# Patient Record
Sex: FEMALE | Race: WHITE | NOT HISPANIC OR LATINO
[De-identification: names, ages, dates, MRNs, and addresses within clinical notes are randomized per-mention and may not be internally consistent; named-entity substitution may affect disease eponyms.]

---

## 2017-02-21 ENCOUNTER — RECORD ABSTRACTING (OUTPATIENT)
Age: 62
End: 2017-02-21

## 2017-02-21 ENCOUNTER — APPOINTMENT (OUTPATIENT)
Dept: ORTHOPEDIC SURGERY | Facility: CLINIC | Age: 62
End: 2017-02-21

## 2017-02-21 DIAGNOSIS — M19.90 UNSPECIFIED OSTEOARTHRITIS, UNSPECIFIED SITE: ICD-10-CM

## 2017-02-21 DIAGNOSIS — M10.062 IDIOPATHIC GOUT, LEFT KNEE: ICD-10-CM

## 2017-02-21 DIAGNOSIS — M54.5 LOW BACK PAIN: ICD-10-CM

## 2017-03-01 ENCOUNTER — APPOINTMENT (OUTPATIENT)
Dept: ORTHOPEDIC SURGERY | Facility: CLINIC | Age: 62
End: 2017-03-01

## 2017-03-30 ENCOUNTER — APPOINTMENT (OUTPATIENT)
Dept: ORTHOPEDIC SURGERY | Facility: CLINIC | Age: 62
End: 2017-03-30

## 2017-04-10 ENCOUNTER — APPOINTMENT (OUTPATIENT)
Dept: ORTHOPEDIC SURGERY | Facility: CLINIC | Age: 62
End: 2017-04-10

## 2017-04-10 DIAGNOSIS — M54.16 RADICULOPATHY, LUMBAR REGION: ICD-10-CM

## 2017-06-09 ENCOUNTER — APPOINTMENT (OUTPATIENT)
Dept: ORTHOPEDIC SURGERY | Facility: CLINIC | Age: 62
End: 2017-06-09

## 2017-06-09 VITALS
HEIGHT: 68 IN | DIASTOLIC BLOOD PRESSURE: 75 MMHG | SYSTOLIC BLOOD PRESSURE: 120 MMHG | HEART RATE: 77 BPM | WEIGHT: 293 LBS | BODY MASS INDEX: 44.41 KG/M2

## 2017-06-09 DIAGNOSIS — M16.11 UNILATERAL PRIMARY OSTEOARTHRITIS, RIGHT HIP: ICD-10-CM

## 2017-07-14 ENCOUNTER — APPOINTMENT (OUTPATIENT)
Dept: ORTHOPEDIC SURGERY | Facility: CLINIC | Age: 62
End: 2017-07-14

## 2017-07-14 VITALS — HEIGHT: 68 IN | BODY MASS INDEX: 44.41 KG/M2 | WEIGHT: 293 LBS

## 2017-07-14 DIAGNOSIS — Z78.9 OTHER SPECIFIED HEALTH STATUS: ICD-10-CM

## 2017-07-14 DIAGNOSIS — G56.03 CARPAL TUNNEL SYNDROM,BILATERAL UPPER LIMBS: ICD-10-CM

## 2017-07-21 ENCOUNTER — APPOINTMENT (OUTPATIENT)
Dept: ORTHOPEDIC SURGERY | Facility: CLINIC | Age: 62
End: 2017-07-21

## 2017-10-04 ENCOUNTER — APPOINTMENT (OUTPATIENT)
Dept: ULTRASOUND IMAGING | Facility: CLINIC | Age: 62
End: 2017-10-04
Payer: COMMERCIAL

## 2017-10-04 ENCOUNTER — OUTPATIENT (OUTPATIENT)
Dept: OUTPATIENT SERVICES | Facility: HOSPITAL | Age: 62
LOS: 1 days | End: 2017-10-04
Payer: COMMERCIAL

## 2017-10-04 DIAGNOSIS — R60.9 EDEMA, UNSPECIFIED: ICD-10-CM

## 2017-10-04 PROCEDURE — 93970 EXTREMITY STUDY: CPT

## 2017-10-04 PROCEDURE — 93970 EXTREMITY STUDY: CPT | Mod: 26

## 2017-11-07 ENCOUNTER — RESULT REVIEW (OUTPATIENT)
Age: 62
End: 2017-11-07

## 2017-12-01 ENCOUNTER — APPOINTMENT (OUTPATIENT)
Dept: ORTHOPEDIC SURGERY | Facility: CLINIC | Age: 62
End: 2017-12-01
Payer: COMMERCIAL

## 2017-12-01 PROCEDURE — 20610 DRAIN/INJ JOINT/BURSA W/O US: CPT | Mod: LT

## 2017-12-08 ENCOUNTER — APPOINTMENT (OUTPATIENT)
Dept: ORTHOPEDIC SURGERY | Facility: CLINIC | Age: 62
End: 2017-12-08
Payer: COMMERCIAL

## 2017-12-08 PROCEDURE — 20610 DRAIN/INJ JOINT/BURSA W/O US: CPT | Mod: LT

## 2017-12-15 ENCOUNTER — APPOINTMENT (OUTPATIENT)
Dept: ORTHOPEDIC SURGERY | Facility: CLINIC | Age: 62
End: 2017-12-15
Payer: COMMERCIAL

## 2017-12-15 PROCEDURE — 20610 DRAIN/INJ JOINT/BURSA W/O US: CPT | Mod: LT

## 2018-07-24 PROBLEM — M16.11 PRIMARY LOCALIZED OSTEOARTHROSIS OF PELVIC REGION, RIGHT: Status: ACTIVE | Noted: 2017-06-09

## 2018-09-15 ENCOUNTER — APPOINTMENT (OUTPATIENT)
Dept: ORTHOPEDIC SURGERY | Facility: CLINIC | Age: 63
End: 2018-09-15

## 2018-09-26 ENCOUNTER — APPOINTMENT (OUTPATIENT)
Dept: ORTHOPEDIC SURGERY | Facility: CLINIC | Age: 63
End: 2018-09-26
Payer: COMMERCIAL

## 2018-09-26 ENCOUNTER — APPOINTMENT (OUTPATIENT)
Dept: ORTHOPEDIC SURGERY | Facility: CLINIC | Age: 63
End: 2018-09-26

## 2018-09-26 DIAGNOSIS — M25.561 PAIN IN RIGHT KNEE: ICD-10-CM

## 2018-09-26 DIAGNOSIS — M25.562 PAIN IN RIGHT KNEE: ICD-10-CM

## 2018-09-26 PROCEDURE — 73564 X-RAY EXAM KNEE 4 OR MORE: CPT | Mod: RT

## 2018-09-26 PROCEDURE — 99213 OFFICE O/P EST LOW 20 MIN: CPT

## 2018-10-13 ENCOUNTER — APPOINTMENT (OUTPATIENT)
Dept: ORTHOPEDIC SURGERY | Facility: CLINIC | Age: 63
End: 2018-10-13
Payer: COMMERCIAL

## 2018-10-13 VITALS
WEIGHT: 293 LBS | DIASTOLIC BLOOD PRESSURE: 88 MMHG | HEART RATE: 63 BPM | BODY MASS INDEX: 45.99 KG/M2 | HEIGHT: 67 IN | SYSTOLIC BLOOD PRESSURE: 168 MMHG

## 2018-10-13 DIAGNOSIS — M25.551 PAIN IN RIGHT HIP: ICD-10-CM

## 2018-10-13 PROCEDURE — 99214 OFFICE O/P EST MOD 30 MIN: CPT

## 2018-10-13 PROCEDURE — 73502 X-RAY EXAM HIP UNI 2-3 VIEWS: CPT | Mod: RT

## 2018-10-17 ENCOUNTER — APPOINTMENT (OUTPATIENT)
Dept: ORTHOPEDIC SURGERY | Facility: CLINIC | Age: 63
End: 2018-10-17
Payer: COMMERCIAL

## 2018-10-17 PROCEDURE — 20610 DRAIN/INJ JOINT/BURSA W/O US: CPT | Mod: RT

## 2018-10-19 ENCOUNTER — APPOINTMENT (OUTPATIENT)
Dept: ORTHOPEDIC SURGERY | Facility: CLINIC | Age: 63
End: 2018-10-19

## 2018-10-26 ENCOUNTER — APPOINTMENT (OUTPATIENT)
Dept: ORTHOPEDIC SURGERY | Facility: CLINIC | Age: 63
End: 2018-10-26
Payer: COMMERCIAL

## 2018-10-26 PROCEDURE — 20610 DRAIN/INJ JOINT/BURSA W/O US: CPT | Mod: LT

## 2018-11-02 ENCOUNTER — TRANSCRIPTION ENCOUNTER (OUTPATIENT)
Age: 63
End: 2018-11-02

## 2018-11-02 ENCOUNTER — APPOINTMENT (OUTPATIENT)
Dept: ORTHOPEDIC SURGERY | Facility: CLINIC | Age: 63
End: 2018-11-02
Payer: COMMERCIAL

## 2018-11-02 PROCEDURE — 20610 DRAIN/INJ JOINT/BURSA W/O US: CPT | Mod: LT

## 2018-11-06 ENCOUNTER — OUTPATIENT (OUTPATIENT)
Dept: OUTPATIENT SERVICES | Facility: HOSPITAL | Age: 63
LOS: 1 days | End: 2018-11-06
Payer: COMMERCIAL

## 2018-11-06 VITALS
TEMPERATURE: 98 F | SYSTOLIC BLOOD PRESSURE: 149 MMHG | RESPIRATION RATE: 16 BRPM | HEART RATE: 58 BPM | WEIGHT: 293 LBS | HEIGHT: 66 IN | OXYGEN SATURATION: 98 % | DIASTOLIC BLOOD PRESSURE: 75 MMHG

## 2018-11-06 DIAGNOSIS — Z90.710 ACQUIRED ABSENCE OF BOTH CERVIX AND UTERUS: Chronic | ICD-10-CM

## 2018-11-06 DIAGNOSIS — Z98.890 OTHER SPECIFIED POSTPROCEDURAL STATES: Chronic | ICD-10-CM

## 2018-11-06 DIAGNOSIS — M16.11 UNILATERAL PRIMARY OSTEOARTHRITIS, RIGHT HIP: ICD-10-CM

## 2018-11-06 DIAGNOSIS — Z87.81 PERSONAL HISTORY OF (HEALED) TRAUMATIC FRACTURE: Chronic | ICD-10-CM

## 2018-11-06 DIAGNOSIS — E89.0 POSTPROCEDURAL HYPOTHYROIDISM: Chronic | ICD-10-CM

## 2018-11-06 DIAGNOSIS — Z01.818 ENCOUNTER FOR OTHER PREPROCEDURAL EXAMINATION: ICD-10-CM

## 2018-11-06 LAB
ALBUMIN SERPL ELPH-MCNC: 3.7 G/DL — SIGNIFICANT CHANGE UP (ref 3.3–5)
ALP SERPL-CCNC: 64 U/L — SIGNIFICANT CHANGE UP (ref 30–120)
ALT FLD-CCNC: 27 U/L DA — SIGNIFICANT CHANGE UP (ref 10–60)
ANION GAP SERPL CALC-SCNC: 7 MMOL/L — SIGNIFICANT CHANGE UP (ref 5–17)
APTT BLD: 35.5 SEC — SIGNIFICANT CHANGE UP (ref 28.5–37)
AST SERPL-CCNC: 18 U/L — SIGNIFICANT CHANGE UP (ref 10–40)
BILIRUB SERPL-MCNC: 0.2 MG/DL — SIGNIFICANT CHANGE UP (ref 0.2–1.2)
BLD GP AB SCN SERPL QL: SIGNIFICANT CHANGE UP
BUN SERPL-MCNC: 33 MG/DL — HIGH (ref 7–23)
CALCIUM SERPL-MCNC: 9.8 MG/DL — SIGNIFICANT CHANGE UP (ref 8.4–10.5)
CHLORIDE SERPL-SCNC: 102 MMOL/L — SIGNIFICANT CHANGE UP (ref 96–108)
CO2 SERPL-SCNC: 31 MMOL/L — SIGNIFICANT CHANGE UP (ref 22–31)
CREAT SERPL-MCNC: 0.91 MG/DL — SIGNIFICANT CHANGE UP (ref 0.5–1.3)
GLUCOSE SERPL-MCNC: 96 MG/DL — SIGNIFICANT CHANGE UP (ref 70–99)
HBA1C BLD-MCNC: 5.8 % — HIGH (ref 4–5.6)
HCT VFR BLD CALC: 40.5 % — SIGNIFICANT CHANGE UP (ref 34.5–45)
HGB BLD-MCNC: 12.3 G/DL — SIGNIFICANT CHANGE UP (ref 11.5–15.5)
INR BLD: 1.09 RATIO — SIGNIFICANT CHANGE UP (ref 0.88–1.16)
MCHC RBC-ENTMCNC: 26.2 PG — LOW (ref 27–34)
MCHC RBC-ENTMCNC: 30.4 GM/DL — LOW (ref 32–36)
MCV RBC AUTO: 86.2 FL — SIGNIFICANT CHANGE UP (ref 80–100)
MRSA PCR RESULT.: SIGNIFICANT CHANGE UP
NRBC # BLD: 0 /100 WBCS — SIGNIFICANT CHANGE UP (ref 0–0)
PLATELET # BLD AUTO: 304 K/UL — SIGNIFICANT CHANGE UP (ref 150–400)
POTASSIUM SERPL-MCNC: 4.5 MMOL/L — SIGNIFICANT CHANGE UP (ref 3.5–5.3)
POTASSIUM SERPL-SCNC: 4.5 MMOL/L — SIGNIFICANT CHANGE UP (ref 3.5–5.3)
PROT SERPL-MCNC: 8.3 G/DL — SIGNIFICANT CHANGE UP (ref 6–8.3)
PROTHROM AB SERPL-ACNC: 11.9 SEC — SIGNIFICANT CHANGE UP (ref 10–12.9)
RBC # BLD: 4.7 M/UL — SIGNIFICANT CHANGE UP (ref 3.8–5.2)
RBC # FLD: 15.6 % — HIGH (ref 10.3–14.5)
S AUREUS DNA NOSE QL NAA+PROBE: SIGNIFICANT CHANGE UP
SODIUM SERPL-SCNC: 140 MMOL/L — SIGNIFICANT CHANGE UP (ref 135–145)
WBC # BLD: 7.75 K/UL — SIGNIFICANT CHANGE UP (ref 3.8–10.5)
WBC # FLD AUTO: 7.75 K/UL — SIGNIFICANT CHANGE UP (ref 3.8–10.5)

## 2018-11-06 PROCEDURE — 86900 BLOOD TYPING SEROLOGIC ABO: CPT

## 2018-11-06 PROCEDURE — 85610 PROTHROMBIN TIME: CPT

## 2018-11-06 PROCEDURE — 86901 BLOOD TYPING SEROLOGIC RH(D): CPT

## 2018-11-06 PROCEDURE — 83036 HEMOGLOBIN GLYCOSYLATED A1C: CPT

## 2018-11-06 PROCEDURE — 93005 ELECTROCARDIOGRAM TRACING: CPT

## 2018-11-06 PROCEDURE — 85730 THROMBOPLASTIN TIME PARTIAL: CPT

## 2018-11-06 PROCEDURE — G0463: CPT

## 2018-11-06 PROCEDURE — 87641 MR-STAPH DNA AMP PROBE: CPT

## 2018-11-06 PROCEDURE — 85027 COMPLETE CBC AUTOMATED: CPT

## 2018-11-06 PROCEDURE — 87640 STAPH A DNA AMP PROBE: CPT

## 2018-11-06 PROCEDURE — 36415 COLL VENOUS BLD VENIPUNCTURE: CPT

## 2018-11-06 PROCEDURE — 93010 ELECTROCARDIOGRAM REPORT: CPT

## 2018-11-06 PROCEDURE — 80053 COMPREHEN METABOLIC PANEL: CPT

## 2018-11-06 PROCEDURE — 86850 RBC ANTIBODY SCREEN: CPT

## 2018-11-06 NOTE — H&P PST ADULT - PSH
History of arm fracture  1993-right  S/P carpal tunnel release  left  S/P hysterectomy  total  S/P thyroidectomy  partial

## 2018-11-06 NOTE — H&P PST ADULT - PMH
BMI 50.0-59.9, adult    Essential hypertension    History of gout    Hypothyroid    Primary osteoarthritis of right hip    Type 2 diabetes mellitus    Venous insufficiency of lower extremity

## 2018-11-07 ENCOUNTER — APPOINTMENT (OUTPATIENT)
Dept: ORTHOPEDIC SURGERY | Facility: CLINIC | Age: 63
End: 2018-11-07

## 2018-11-14 ENCOUNTER — APPOINTMENT (OUTPATIENT)
Dept: ORTHOPEDIC SURGERY | Facility: CLINIC | Age: 63
End: 2018-11-14
Payer: COMMERCIAL

## 2018-11-14 VITALS — BODY MASS INDEX: 45.99 KG/M2 | HEIGHT: 67 IN | WEIGHT: 293 LBS

## 2018-11-14 DIAGNOSIS — Z87.39 PERSONAL HISTORY OF OTHER DISEASES OF THE MUSCULOSKELETAL SYSTEM AND CONNECTIVE TISSUE: ICD-10-CM

## 2018-11-14 PROCEDURE — 99214 OFFICE O/P EST MOD 30 MIN: CPT

## 2018-11-14 RX ORDER — APREPITANT 80 MG/1
40 CAPSULE ORAL DAILY
Qty: 0 | Refills: 0 | Status: DISCONTINUED | OUTPATIENT
Start: 2018-11-26 | End: 2018-11-26

## 2018-11-15 PROBLEM — Z87.39 PERSONAL HISTORY OF OTHER DISEASES OF THE MUSCULOSKELETAL SYSTEM AND CONNECTIVE TISSUE: Chronic | Status: ACTIVE | Noted: 2018-11-06

## 2018-11-15 PROBLEM — E11.9 TYPE 2 DIABETES MELLITUS WITHOUT COMPLICATIONS: Chronic | Status: ACTIVE | Noted: 2018-11-06

## 2018-11-15 PROBLEM — M16.11 UNILATERAL PRIMARY OSTEOARTHRITIS, RIGHT HIP: Chronic | Status: ACTIVE | Noted: 2018-11-06

## 2018-11-15 PROBLEM — I10 ESSENTIAL (PRIMARY) HYPERTENSION: Chronic | Status: ACTIVE | Noted: 2018-11-06

## 2018-11-15 PROBLEM — E03.9 HYPOTHYROIDISM, UNSPECIFIED: Chronic | Status: ACTIVE | Noted: 2018-11-06

## 2018-11-15 PROBLEM — I87.2 VENOUS INSUFFICIENCY (CHRONIC) (PERIPHERAL): Chronic | Status: ACTIVE | Noted: 2018-11-06

## 2018-11-20 ENCOUNTER — APPOINTMENT (OUTPATIENT)
Dept: ORTHOPEDIC SURGERY | Facility: CLINIC | Age: 63
End: 2018-11-20
Payer: COMMERCIAL

## 2018-11-20 VITALS — HEART RATE: 66 BPM | SYSTOLIC BLOOD PRESSURE: 176 MMHG | DIASTOLIC BLOOD PRESSURE: 94 MMHG

## 2018-11-20 PROCEDURE — 99213 OFFICE O/P EST LOW 20 MIN: CPT

## 2018-11-23 RX ORDER — POLYETHYLENE GLYCOL 3350 17 G/17G
17 POWDER, FOR SOLUTION ORAL DAILY
Qty: 0 | Refills: 0 | Status: DISCONTINUED | OUTPATIENT
Start: 2018-11-26 | End: 2018-11-28

## 2018-11-23 RX ORDER — SENNA PLUS 8.6 MG/1
2 TABLET ORAL AT BEDTIME
Qty: 0 | Refills: 0 | Status: DISCONTINUED | OUTPATIENT
Start: 2018-11-26 | End: 2018-11-28

## 2018-11-23 RX ORDER — PANTOPRAZOLE SODIUM 20 MG/1
40 TABLET, DELAYED RELEASE ORAL
Qty: 0 | Refills: 0 | Status: DISCONTINUED | OUTPATIENT
Start: 2018-11-26 | End: 2018-11-28

## 2018-11-23 RX ORDER — DOCUSATE SODIUM 100 MG
100 CAPSULE ORAL THREE TIMES A DAY
Qty: 0 | Refills: 0 | Status: DISCONTINUED | OUTPATIENT
Start: 2018-11-26 | End: 2018-11-28

## 2018-11-23 RX ORDER — MAGNESIUM HYDROXIDE 400 MG/1
30 TABLET, CHEWABLE ORAL DAILY
Qty: 0 | Refills: 0 | Status: DISCONTINUED | OUTPATIENT
Start: 2018-11-26 | End: 2018-11-28

## 2018-11-23 RX ORDER — ONDANSETRON 8 MG/1
4 TABLET, FILM COATED ORAL EVERY 6 HOURS
Qty: 0 | Refills: 0 | Status: DISCONTINUED | OUTPATIENT
Start: 2018-11-26 | End: 2018-11-28

## 2018-11-26 ENCOUNTER — RESULT REVIEW (OUTPATIENT)
Age: 63
End: 2018-11-26

## 2018-11-26 ENCOUNTER — APPOINTMENT (OUTPATIENT)
Dept: ORTHOPEDIC SURGERY | Facility: HOSPITAL | Age: 63
End: 2018-11-26

## 2018-11-26 ENCOUNTER — INPATIENT (INPATIENT)
Facility: HOSPITAL | Age: 63
LOS: 1 days | Discharge: NURSING, REHAB OR LONG-TERM CARE | DRG: 470 | End: 2018-11-28
Attending: ORTHOPAEDIC SURGERY | Admitting: ORTHOPAEDIC SURGERY
Payer: COMMERCIAL

## 2018-11-26 VITALS
WEIGHT: 293 LBS | RESPIRATION RATE: 16 BRPM | SYSTOLIC BLOOD PRESSURE: 148 MMHG | HEIGHT: 67 IN | TEMPERATURE: 98 F | HEART RATE: 61 BPM | DIASTOLIC BLOOD PRESSURE: 65 MMHG | OXYGEN SATURATION: 98 %

## 2018-11-26 DIAGNOSIS — M16.11 UNILATERAL PRIMARY OSTEOARTHRITIS, RIGHT HIP: ICD-10-CM

## 2018-11-26 DIAGNOSIS — Z98.890 OTHER SPECIFIED POSTPROCEDURAL STATES: Chronic | ICD-10-CM

## 2018-11-26 DIAGNOSIS — Z87.81 PERSONAL HISTORY OF (HEALED) TRAUMATIC FRACTURE: Chronic | ICD-10-CM

## 2018-11-26 DIAGNOSIS — Z90.710 ACQUIRED ABSENCE OF BOTH CERVIX AND UTERUS: Chronic | ICD-10-CM

## 2018-11-26 DIAGNOSIS — E89.0 POSTPROCEDURAL HYPOTHYROIDISM: Chronic | ICD-10-CM

## 2018-11-26 LAB
ANION GAP SERPL CALC-SCNC: 8 MMOL/L — SIGNIFICANT CHANGE UP (ref 5–17)
BUN SERPL-MCNC: 28 MG/DL — HIGH (ref 7–23)
CALCIUM SERPL-MCNC: 9.2 MG/DL — SIGNIFICANT CHANGE UP (ref 8.4–10.5)
CHLORIDE SERPL-SCNC: 102 MMOL/L — SIGNIFICANT CHANGE UP (ref 96–108)
CO2 SERPL-SCNC: 30 MMOL/L — SIGNIFICANT CHANGE UP (ref 22–31)
CREAT SERPL-MCNC: 0.84 MG/DL — SIGNIFICANT CHANGE UP (ref 0.5–1.3)
GLUCOSE SERPL-MCNC: 148 MG/DL — HIGH (ref 70–99)
HCT VFR BLD CALC: 36.1 % — SIGNIFICANT CHANGE UP (ref 34.5–45)
HGB BLD-MCNC: 10.7 G/DL — LOW (ref 11.5–15.5)
MCHC RBC-ENTMCNC: 25.5 PG — LOW (ref 27–34)
MCHC RBC-ENTMCNC: 29.6 GM/DL — LOW (ref 32–36)
MCV RBC AUTO: 86 FL — SIGNIFICANT CHANGE UP (ref 80–100)
NRBC # BLD: 0 /100 WBCS — SIGNIFICANT CHANGE UP (ref 0–0)
PLATELET # BLD AUTO: 268 K/UL — SIGNIFICANT CHANGE UP (ref 150–400)
POTASSIUM SERPL-MCNC: 4.8 MMOL/L — SIGNIFICANT CHANGE UP (ref 3.5–5.3)
POTASSIUM SERPL-SCNC: 4.8 MMOL/L — SIGNIFICANT CHANGE UP (ref 3.5–5.3)
RBC # BLD: 4.2 M/UL — SIGNIFICANT CHANGE UP (ref 3.8–5.2)
RBC # FLD: 15.6 % — HIGH (ref 10.3–14.5)
SODIUM SERPL-SCNC: 140 MMOL/L — SIGNIFICANT CHANGE UP (ref 135–145)
WBC # BLD: 13.16 K/UL — HIGH (ref 3.8–10.5)
WBC # FLD AUTO: 13.16 K/UL — HIGH (ref 3.8–10.5)

## 2018-11-26 PROCEDURE — 27130 TOTAL HIP ARTHROPLASTY: CPT | Mod: AS,RT

## 2018-11-26 PROCEDURE — 88311 DECALCIFY TISSUE: CPT | Mod: 26

## 2018-11-26 PROCEDURE — 99223 1ST HOSP IP/OBS HIGH 75: CPT

## 2018-11-26 PROCEDURE — 88305 TISSUE EXAM BY PATHOLOGIST: CPT | Mod: 26

## 2018-11-26 PROCEDURE — 27130 TOTAL HIP ARTHROPLASTY: CPT | Mod: RT

## 2018-11-26 RX ORDER — TRANEXAMIC ACID 100 MG/ML
1000 INJECTION, SOLUTION INTRAVENOUS ONCE
Qty: 0 | Refills: 0 | Status: COMPLETED | OUTPATIENT
Start: 2018-11-26 | End: 2018-11-26

## 2018-11-26 RX ORDER — ACETAMINOPHEN 500 MG
1000 TABLET ORAL EVERY 6 HOURS
Qty: 0 | Refills: 0 | Status: COMPLETED | OUTPATIENT
Start: 2018-11-26 | End: 2018-11-27

## 2018-11-26 RX ORDER — OXYCODONE HYDROCHLORIDE 5 MG/1
10 TABLET ORAL
Qty: 0 | Refills: 0 | Status: DISCONTINUED | OUTPATIENT
Start: 2018-11-26 | End: 2018-11-28

## 2018-11-26 RX ORDER — CEFAZOLIN SODIUM 1 G
3000 VIAL (EA) INJECTION ONCE
Qty: 0 | Refills: 0 | Status: COMPLETED | OUTPATIENT
Start: 2018-11-26 | End: 2018-11-26

## 2018-11-26 RX ORDER — APIXABAN 2.5 MG/1
2.5 TABLET, FILM COATED ORAL EVERY 12 HOURS
Qty: 0 | Refills: 0 | Status: DISCONTINUED | OUTPATIENT
Start: 2018-11-27 | End: 2018-11-28

## 2018-11-26 RX ORDER — LEVOTHYROXINE SODIUM 125 MCG
200 TABLET ORAL DAILY
Qty: 0 | Refills: 0 | Status: DISCONTINUED | OUTPATIENT
Start: 2018-11-26 | End: 2018-11-28

## 2018-11-26 RX ORDER — SODIUM CHLORIDE 9 MG/ML
1000 INJECTION, SOLUTION INTRAVENOUS
Qty: 0 | Refills: 0 | Status: DISCONTINUED | OUTPATIENT
Start: 2018-11-26 | End: 2018-11-28

## 2018-11-26 RX ORDER — CHLORHEXIDINE GLUCONATE 213 G/1000ML
1 SOLUTION TOPICAL ONCE
Qty: 0 | Refills: 0 | Status: COMPLETED | OUTPATIENT
Start: 2018-11-26 | End: 2018-11-26

## 2018-11-26 RX ORDER — DEXTROSE 50 % IN WATER 50 %
25 SYRINGE (ML) INTRAVENOUS ONCE
Qty: 0 | Refills: 0 | Status: DISCONTINUED | OUTPATIENT
Start: 2018-11-26 | End: 2018-11-28

## 2018-11-26 RX ORDER — ALLOPURINOL 300 MG
300 TABLET ORAL DAILY
Qty: 0 | Refills: 0 | Status: DISCONTINUED | OUTPATIENT
Start: 2018-11-26 | End: 2018-11-28

## 2018-11-26 RX ORDER — DEXTROSE 50 % IN WATER 50 %
15 SYRINGE (ML) INTRAVENOUS ONCE
Qty: 0 | Refills: 0 | Status: DISCONTINUED | OUTPATIENT
Start: 2018-11-26 | End: 2018-11-28

## 2018-11-26 RX ORDER — ACETAMINOPHEN 500 MG
1000 TABLET ORAL ONCE
Qty: 0 | Refills: 0 | Status: COMPLETED | OUTPATIENT
Start: 2018-11-26 | End: 2018-11-26

## 2018-11-26 RX ORDER — OXYCODONE HYDROCHLORIDE 5 MG/1
5 TABLET ORAL
Qty: 0 | Refills: 0 | Status: DISCONTINUED | OUTPATIENT
Start: 2018-11-26 | End: 2018-11-28

## 2018-11-26 RX ORDER — ACETAMINOPHEN 500 MG
1000 TABLET ORAL EVERY 8 HOURS
Qty: 0 | Refills: 0 | Status: DISCONTINUED | OUTPATIENT
Start: 2018-11-27 | End: 2018-11-28

## 2018-11-26 RX ORDER — METFORMIN HYDROCHLORIDE 850 MG/1
500 TABLET ORAL DAILY
Qty: 0 | Refills: 0 | Status: DISCONTINUED | OUTPATIENT
Start: 2018-11-26 | End: 2018-11-28

## 2018-11-26 RX ORDER — GLUCAGON INJECTION, SOLUTION 0.5 MG/.1ML
1 INJECTION, SOLUTION SUBCUTANEOUS ONCE
Qty: 0 | Refills: 0 | Status: DISCONTINUED | OUTPATIENT
Start: 2018-11-26 | End: 2018-11-28

## 2018-11-26 RX ORDER — HYDROMORPHONE HYDROCHLORIDE 2 MG/ML
0.5 INJECTION INTRAMUSCULAR; INTRAVENOUS; SUBCUTANEOUS
Qty: 0 | Refills: 0 | Status: DISCONTINUED | OUTPATIENT
Start: 2018-11-26 | End: 2018-11-26

## 2018-11-26 RX ORDER — CELECOXIB 200 MG/1
200 CAPSULE ORAL EVERY 12 HOURS
Qty: 0 | Refills: 0 | Status: DISCONTINUED | OUTPATIENT
Start: 2018-11-26 | End: 2018-11-28

## 2018-11-26 RX ORDER — ONDANSETRON 8 MG/1
4 TABLET, FILM COATED ORAL ONCE
Qty: 0 | Refills: 0 | Status: DISCONTINUED | OUTPATIENT
Start: 2018-11-26 | End: 2018-11-26

## 2018-11-26 RX ORDER — LOSARTAN POTASSIUM 100 MG/1
50 TABLET, FILM COATED ORAL DAILY
Qty: 0 | Refills: 0 | Status: DISCONTINUED | OUTPATIENT
Start: 2018-11-28 | End: 2018-11-28

## 2018-11-26 RX ORDER — SODIUM CHLORIDE 9 MG/ML
1000 INJECTION, SOLUTION INTRAVENOUS
Qty: 0 | Refills: 0 | Status: DISCONTINUED | OUTPATIENT
Start: 2018-11-26 | End: 2018-11-27

## 2018-11-26 RX ORDER — HYDROMORPHONE HYDROCHLORIDE 2 MG/ML
0.5 INJECTION INTRAMUSCULAR; INTRAVENOUS; SUBCUTANEOUS
Qty: 0 | Refills: 0 | Status: DISCONTINUED | OUTPATIENT
Start: 2018-11-26 | End: 2018-11-28

## 2018-11-26 RX ORDER — NEBIVOLOL HYDROCHLORIDE 5 MG/1
2.5 TABLET ORAL DAILY
Qty: 0 | Refills: 0 | Status: DISCONTINUED | OUTPATIENT
Start: 2018-11-26 | End: 2018-11-28

## 2018-11-26 RX ORDER — INSULIN LISPRO 100/ML
VIAL (ML) SUBCUTANEOUS
Qty: 0 | Refills: 0 | Status: DISCONTINUED | OUTPATIENT
Start: 2018-11-26 | End: 2018-11-27

## 2018-11-26 RX ORDER — CEFAZOLIN SODIUM 1 G
3000 VIAL (EA) INJECTION EVERY 8 HOURS
Qty: 0 | Refills: 0 | Status: COMPLETED | OUTPATIENT
Start: 2018-11-26 | End: 2018-11-26

## 2018-11-26 RX ORDER — DEXTROSE 50 % IN WATER 50 %
12.5 SYRINGE (ML) INTRAVENOUS ONCE
Qty: 0 | Refills: 0 | Status: DISCONTINUED | OUTPATIENT
Start: 2018-11-26 | End: 2018-11-28

## 2018-11-26 RX ORDER — SODIUM CHLORIDE 9 MG/ML
1000 INJECTION, SOLUTION INTRAVENOUS
Qty: 0 | Refills: 0 | Status: DISCONTINUED | OUTPATIENT
Start: 2018-11-26 | End: 2018-11-26

## 2018-11-26 RX ADMIN — APREPITANT 40 MILLIGRAM(S): 80 CAPSULE ORAL at 07:27

## 2018-11-26 RX ADMIN — SODIUM CHLORIDE 125 MILLILITER(S): 9 INJECTION, SOLUTION INTRAVENOUS at 15:51

## 2018-11-26 RX ADMIN — Medication 400 MILLIGRAM(S): at 15:51

## 2018-11-26 RX ADMIN — Medication 200 MILLIGRAM(S): at 23:57

## 2018-11-26 RX ADMIN — OXYCODONE HYDROCHLORIDE 10 MILLIGRAM(S): 5 TABLET ORAL at 21:31

## 2018-11-26 RX ADMIN — Medication 1000 MILLIGRAM(S): at 16:12

## 2018-11-26 RX ADMIN — CHLORHEXIDINE GLUCONATE 1 APPLICATION(S): 213 SOLUTION TOPICAL at 07:25

## 2018-11-26 RX ADMIN — CELECOXIB 200 MILLIGRAM(S): 200 CAPSULE ORAL at 21:23

## 2018-11-26 RX ADMIN — HYDROMORPHONE HYDROCHLORIDE 0.5 MILLIGRAM(S): 2 INJECTION INTRAMUSCULAR; INTRAVENOUS; SUBCUTANEOUS at 19:18

## 2018-11-26 RX ADMIN — Medication 100 MILLIGRAM(S): at 21:23

## 2018-11-26 RX ADMIN — SODIUM CHLORIDE 75 MILLILITER(S): 9 INJECTION, SOLUTION INTRAVENOUS at 11:21

## 2018-11-26 RX ADMIN — CELECOXIB 200 MILLIGRAM(S): 200 CAPSULE ORAL at 21:30

## 2018-11-26 RX ADMIN — Medication 1000 MILLIGRAM(S): at 21:00

## 2018-11-26 RX ADMIN — HYDROMORPHONE HYDROCHLORIDE 0.5 MILLIGRAM(S): 2 INJECTION INTRAMUSCULAR; INTRAVENOUS; SUBCUTANEOUS at 18:59

## 2018-11-26 RX ADMIN — HYDROMORPHONE HYDROCHLORIDE 0.5 MILLIGRAM(S): 2 INJECTION INTRAMUSCULAR; INTRAVENOUS; SUBCUTANEOUS at 11:10

## 2018-11-26 RX ADMIN — Medication 200 MILLIGRAM(S): at 16:56

## 2018-11-26 RX ADMIN — Medication 400 MILLIGRAM(S): at 21:23

## 2018-11-26 RX ADMIN — SENNA PLUS 2 TABLET(S): 8.6 TABLET ORAL at 21:23

## 2018-11-26 RX ADMIN — OXYCODONE HYDROCHLORIDE 10 MILLIGRAM(S): 5 TABLET ORAL at 22:33

## 2018-11-26 NOTE — PHYSICAL THERAPY INITIAL EVALUATION ADULT - CRITERIA FOR SKILLED THERAPEUTIC INTERVENTIONS
anticipated equipment needs at discharge/impairments found/anticipated discharge recommendation/functional limitations in following categories/therapy frequency/predicted duration of therapy intervention

## 2018-11-26 NOTE — OCCUPATIONAL THERAPY INITIAL EVALUATION ADULT - ADDITIONAL COMMENTS
Pt lives with brother + tub Pt lives with brother + tub with doors pt can use back door 3 MAO + railing and a platform step. Pt can stay on 1st floor

## 2018-11-26 NOTE — PATIENT PROFILE ADULT - NSPROHMDIABETMGMTSTRAT_GEN_A_NUR
medication therapy/blood glucose testing/diet modification routine screenings/weight management/diet modification

## 2018-11-26 NOTE — PHYSICAL THERAPY INITIAL EVALUATION ADULT - GAIT DEVIATIONS NOTED, PT EVAL
decreased step length/decreased stride length/decreased weight-shifting ability/decreased tiffanie/decreased velocity of limb motion

## 2018-11-26 NOTE — PHYSICAL THERAPY INITIAL EVALUATION ADULT - ADDITIONAL COMMENTS
Pt lives with brother in house. Post discharge, pt will be using back door with 1+3 MAO with handrails. Pt will be staying on first floor of house and will have assistance from brother. Pt used 2 straight canes for ambulation at home. Pt has rolling walker and wheelchair as well.

## 2018-11-26 NOTE — BRIEF OPERATIVE NOTE - PROCEDURE
<<-----Click on this checkbox to enter Procedure Hip arthroplasty  11/26/2018  right anterior approach  Active  FREIDAK

## 2018-11-26 NOTE — OCCUPATIONAL THERAPY INITIAL EVALUATION ADULT - GENERAL OBSERVATIONS, REHAB EVAL
Patient with decreased ADL status and impairments with functional mobility. Pt found supine in bed +SCDs, IV, hemovac, LE wedges for positioning, NCO2

## 2018-11-26 NOTE — PHYSICAL THERAPY INITIAL EVALUATION ADULT - GENERAL OBSERVATIONS, REHAB EVAL
Pt supine in bed. +IV, +PAS, +Hemovac, +Tele, Pt supine in bed. +IV, +PAS, +Hemovac, +Tele, +Supplemental oxygen

## 2018-11-26 NOTE — CONSULT NOTE ADULT - ASSESSMENT
Patient is 64 yo female with hx of Obesity, Essential hypertension, gout, Hypothyroid, Primary osteoarthritis of right hip, Type 2 diabetes mellitus, and Venous insufficiency of lower extremity presenting with     1.  S/P Right Total hip replacement.  Continue with pain management, DVT proph, and wound care as per Ortho.  PT/OT  2. HTN.  Continue with Bystolic, ARBS, aldactone, and Hold Bumex  3.  Gout.  continue with allopurinol  4.  DM2.  Hold oral medications.  ISS, and Monitor POC  5. Hypothyroid.  Continue with levothyroxine  6.  Left shoulder pain.  Will obtain shoulder x-ray.  Ortho to follow up   Plan of care was discussed with patient, and brother in great details, All questions were answered to their satisfaction  Seems to understand, and in agreement

## 2018-11-26 NOTE — CONSULT NOTE ADULT - SUBJECTIVE AND OBJECTIVE BOX
CC.  S/P Right Total hip replacement  HPI.  Patient is 62 yo female with hx of HTN, and DM2 presenting with s/p right Total hip replacement.  Pain well controlled.    Patient is complaining of left shoulder pain that has been going on for the past several weeks, and contribute to using the cane    Offers no other complaints      Constitutional: No fever, fatigue or weight loss.  Skin: No rash.  Eyes: No recent vision problems or eye pain.  ENT: No congestion, ear pain, or sore throat.  Endocrine: No thyroid problems.  Cardiovascular: No chest pain or palpation.  Respiratory: No cough, shortness of breath, congestion, or wheezing.  Gastrointestinal: No abdominal pain, nausea, vomiting, or diarrhea.  Genitourinary: No dysuria.  Musculoskeletal: No joint swelling.  Neurologic: No headache.       Allergies/Medications:   Allergies:        Allergies:  	No Known Allergies:     Home Medications:   * Patient Currently Takes Medications as of 06-Nov-2018 11:37 documented in Structured Notes  · 	Bystolic 2.5 mg oral tablet: Last Dose Taken:  , 1 tab(s) orally once a day  · 	olmesartan 20 mg oral tablet: Last Dose Taken:  , 1 tab(s) orally once a day  · 	spironolactone 25 mg oral tablet: Last Dose Taken:  , 1 tab(s) orally 2 times a day  · 	bumetanide 1 mg oral tablet: Last Dose Taken:  , 1 tab(s) orally once a day  · 	levothyroxine 200 mcg (0.2 mg) oral tablet: Last Dose Taken:  , 1 tab(s) orally once a day  · 	allopurinol 300 mg oral tablet: Last Dose Taken:  , 1 tab(s) orally once a day  · 	metFORMIN 500 mg oral tablet, extended release: Last Dose Taken:  , 1 tab(s) orally once a day  · 	indomethacin 75 mg oral capsule, extended release: Last Dose Taken:  , 1 cap(s) orally once a day    PMH/PSH/FH/SH:    Past Medical History:  BMI 50.0-59.9, adult    Essential hypertension    History of gout    Hypothyroid    Primary osteoarthritis of right hip    Type 2 diabetes mellitus    Venous insufficiency of lower extremity.     Past Surgical History:  History of arm fracture  1993-right  S/P carpal tunnel release  left  S/P hysterectomy  total  S/P thyroidectomy  partial.     Family History:  Father  Still living? No  Family history of heart attack, Age at diagnosis: Age Unknown.     Social History:  · Marital Status	Single  · Lives With	spouse  denies any ETOH/tob/Illicit drug usage    Vital Signs Last 24 Hrs  T(C): 36.3 (26 Nov 2018 10:40), Max: 36.8 (26 Nov 2018 06:57)  T(F): 97.4 (26 Nov 2018 10:40), Max: 98.2 (26 Nov 2018 06:57)  HR: 50 (26 Nov 2018 13:29) (44 - 67)  BP: 117/69 (26 Nov 2018 13:29) (105/61 - 148/65)  BP(mean): --  RR: 9 (26 Nov 2018 13:29) (8 - 17)  SpO2: 98% (26 Nov 2018 13:29) (94% - 100%)      PHYSICAL EXAM-  GENERAL: NAD, well-groomed, well-developed  HEAD:  Atraumatic, Normocephalic  EYES: EOMI, PERRLA, conjunctiva and sclera clear  NECK: Supple, No JVD, Normal thyroid  NERVOUS SYSTEM:  Alert & Oriented X3, Motor Strength 5/5 B/L upper and lower extremities; DTRs 2+ intact and symmetric  CHEST/LUNG: Clear to percussion bilaterally; No rales, rhonchi, wheezing, or rubs  HEART: Regular rate and rhythm; No murmurs, rubs, or gallops  ABDOMEN: Soft, Nontender, Nondistended; Bowel sounds present  EXTREMITIES:  2+ Peripheral Pulses, No clubbing, cyanosis, or edema  SKIN: No rashes or lesions      Imaging Personally Reviewed:     [x ] YES  [ ] NO    Consultant(s) Notes Reviewed:  [x ] YES  [ ] NO    Care Discussed with Consultants/Other Providers [x ] YES  [ ] NO

## 2018-11-27 ENCOUNTER — TRANSCRIPTION ENCOUNTER (OUTPATIENT)
Age: 63
End: 2018-11-27

## 2018-11-27 LAB
ANION GAP SERPL CALC-SCNC: 7 MMOL/L — SIGNIFICANT CHANGE UP (ref 5–17)
BUN SERPL-MCNC: 27 MG/DL — HIGH (ref 7–23)
CALCIUM SERPL-MCNC: 9.2 MG/DL — SIGNIFICANT CHANGE UP (ref 8.4–10.5)
CHLORIDE SERPL-SCNC: 102 MMOL/L — SIGNIFICANT CHANGE UP (ref 96–108)
CO2 SERPL-SCNC: 30 MMOL/L — SIGNIFICANT CHANGE UP (ref 22–31)
CREAT SERPL-MCNC: 0.82 MG/DL — SIGNIFICANT CHANGE UP (ref 0.5–1.3)
GLUCOSE SERPL-MCNC: 113 MG/DL — HIGH (ref 70–99)
HCT VFR BLD CALC: 33.2 % — LOW (ref 34.5–45)
HGB BLD-MCNC: 9.9 G/DL — LOW (ref 11.5–15.5)
MCHC RBC-ENTMCNC: 26 PG — LOW (ref 27–34)
MCHC RBC-ENTMCNC: 29.8 GM/DL — LOW (ref 32–36)
MCV RBC AUTO: 87.1 FL — SIGNIFICANT CHANGE UP (ref 80–100)
NRBC # BLD: 0 /100 WBCS — SIGNIFICANT CHANGE UP (ref 0–0)
PLATELET # BLD AUTO: 249 K/UL — SIGNIFICANT CHANGE UP (ref 150–400)
POTASSIUM SERPL-MCNC: 4.5 MMOL/L — SIGNIFICANT CHANGE UP (ref 3.5–5.3)
POTASSIUM SERPL-SCNC: 4.5 MMOL/L — SIGNIFICANT CHANGE UP (ref 3.5–5.3)
RBC # BLD: 3.81 M/UL — SIGNIFICANT CHANGE UP (ref 3.8–5.2)
RBC # FLD: 15.5 % — HIGH (ref 10.3–14.5)
SODIUM SERPL-SCNC: 139 MMOL/L — SIGNIFICANT CHANGE UP (ref 135–145)
WBC # BLD: 11.53 K/UL — HIGH (ref 3.8–10.5)
WBC # FLD AUTO: 11.53 K/UL — HIGH (ref 3.8–10.5)

## 2018-11-27 PROCEDURE — 99232 SBSQ HOSP IP/OBS MODERATE 35: CPT

## 2018-11-27 PROCEDURE — 73030 X-RAY EXAM OF SHOULDER: CPT | Mod: 26,LT

## 2018-11-27 RX ORDER — INDOMETHACIN 50 MG
1 CAPSULE ORAL
Qty: 0 | Refills: 0 | COMMUNITY

## 2018-11-27 RX ORDER — DOCUSATE SODIUM 100 MG
1 CAPSULE ORAL
Qty: 0 | Refills: 0 | COMMUNITY
Start: 2018-11-27

## 2018-11-27 RX ORDER — OXYCODONE HYDROCHLORIDE 5 MG/1
1 TABLET ORAL
Qty: 42 | Refills: 0 | OUTPATIENT
Start: 2018-11-27 | End: 2018-12-03

## 2018-11-27 RX ORDER — APIXABAN 2.5 MG/1
1 TABLET, FILM COATED ORAL
Qty: 7 | Refills: 0 | OUTPATIENT
Start: 2018-11-27

## 2018-11-27 RX ORDER — CELECOXIB 200 MG/1
1 CAPSULE ORAL
Qty: 60 | Refills: 0 | OUTPATIENT
Start: 2018-11-27 | End: 2018-12-26

## 2018-11-27 RX ORDER — PANTOPRAZOLE SODIUM 20 MG/1
1 TABLET, DELAYED RELEASE ORAL
Qty: 30 | Refills: 1 | OUTPATIENT
Start: 2018-11-27 | End: 2019-01-25

## 2018-11-27 RX ORDER — ACETAMINOPHEN 500 MG
2 TABLET ORAL
Qty: 0 | Refills: 0 | COMMUNITY
Start: 2018-11-27

## 2018-11-27 RX ORDER — APIXABAN 2.5 MG/1
1 TABLET, FILM COATED ORAL
Qty: 60 | Refills: 0 | OUTPATIENT
Start: 2018-11-27 | End: 2018-12-26

## 2018-11-27 RX ORDER — SENNA PLUS 8.6 MG/1
2 TABLET ORAL
Qty: 0 | Refills: 0 | COMMUNITY
Start: 2018-11-27

## 2018-11-27 RX ORDER — POLYETHYLENE GLYCOL 3350 17 G/17G
17 POWDER, FOR SOLUTION ORAL
Qty: 0 | Refills: 0 | COMMUNITY
Start: 2018-11-27

## 2018-11-27 RX ADMIN — Medication 1000 MILLIGRAM(S): at 18:39

## 2018-11-27 RX ADMIN — OXYCODONE HYDROCHLORIDE 10 MILLIGRAM(S): 5 TABLET ORAL at 20:54

## 2018-11-27 RX ADMIN — SENNA PLUS 2 TABLET(S): 8.6 TABLET ORAL at 20:20

## 2018-11-27 RX ADMIN — OXYCODONE HYDROCHLORIDE 10 MILLIGRAM(S): 5 TABLET ORAL at 01:00

## 2018-11-27 RX ADMIN — Medication 100 MILLIGRAM(S): at 13:49

## 2018-11-27 RX ADMIN — Medication 1000 MILLIGRAM(S): at 17:06

## 2018-11-27 RX ADMIN — Medication 400 MILLIGRAM(S): at 02:57

## 2018-11-27 RX ADMIN — APIXABAN 2.5 MILLIGRAM(S): 2.5 TABLET, FILM COATED ORAL at 20:20

## 2018-11-27 RX ADMIN — APIXABAN 2.5 MILLIGRAM(S): 2.5 TABLET, FILM COATED ORAL at 08:17

## 2018-11-27 RX ADMIN — CELECOXIB 200 MILLIGRAM(S): 200 CAPSULE ORAL at 08:17

## 2018-11-27 RX ADMIN — Medication 1000 MILLIGRAM(S): at 02:57

## 2018-11-27 RX ADMIN — OXYCODONE HYDROCHLORIDE 10 MILLIGRAM(S): 5 TABLET ORAL at 04:00

## 2018-11-27 RX ADMIN — OXYCODONE HYDROCHLORIDE 10 MILLIGRAM(S): 5 TABLET ORAL at 20:20

## 2018-11-27 RX ADMIN — OXYCODONE HYDROCHLORIDE 10 MILLIGRAM(S): 5 TABLET ORAL at 06:52

## 2018-11-27 RX ADMIN — OXYCODONE HYDROCHLORIDE 10 MILLIGRAM(S): 5 TABLET ORAL at 00:10

## 2018-11-27 RX ADMIN — CELECOXIB 200 MILLIGRAM(S): 200 CAPSULE ORAL at 20:20

## 2018-11-27 RX ADMIN — OXYCODONE HYDROCHLORIDE 10 MILLIGRAM(S): 5 TABLET ORAL at 06:08

## 2018-11-27 RX ADMIN — PANTOPRAZOLE SODIUM 40 MILLIGRAM(S): 20 TABLET, DELAYED RELEASE ORAL at 06:08

## 2018-11-27 RX ADMIN — Medication 100 MILLIGRAM(S): at 06:08

## 2018-11-27 RX ADMIN — OXYCODONE HYDROCHLORIDE 10 MILLIGRAM(S): 5 TABLET ORAL at 09:12

## 2018-11-27 RX ADMIN — Medication 1000 MILLIGRAM(S): at 08:18

## 2018-11-27 RX ADMIN — METFORMIN HYDROCHLORIDE 500 MILLIGRAM(S): 850 TABLET ORAL at 12:21

## 2018-11-27 RX ADMIN — Medication 1000 MILLIGRAM(S): at 08:17

## 2018-11-27 RX ADMIN — CELECOXIB 200 MILLIGRAM(S): 200 CAPSULE ORAL at 08:18

## 2018-11-27 RX ADMIN — CELECOXIB 200 MILLIGRAM(S): 200 CAPSULE ORAL at 20:21

## 2018-11-27 RX ADMIN — Medication 300 MILLIGRAM(S): at 12:21

## 2018-11-27 RX ADMIN — Medication 200 MICROGRAM(S): at 06:08

## 2018-11-27 RX ADMIN — OXYCODONE HYDROCHLORIDE 10 MILLIGRAM(S): 5 TABLET ORAL at 03:01

## 2018-11-27 RX ADMIN — OXYCODONE HYDROCHLORIDE 10 MILLIGRAM(S): 5 TABLET ORAL at 11:00

## 2018-11-27 RX ADMIN — Medication 100 MILLIGRAM(S): at 20:20

## 2018-11-27 NOTE — DISCHARGE NOTE ADULT - HOSPITAL COURSE
SLICK AUGUSTIN    Admitted on 11-26-18     63y y.o.  Female with history of DJD (degenerative joint disease): right hip    Surgery:   Right anterior Hip arthroplasty    Orthopedic Surgeon:   Dr. TED Cormier    [place]-operative antibiotic:    ceFAZolin   IVPB:,       Consulted Services : Hospitalist, Physical Therapy, Occupational Therapy    Typical Physical & occupational therapy modalities post Hip arthroplasty   were performed including ambulation training, range of motion, ADL's, and transfers.     DVT prophylaxis:  apixaban: 2.5 milliGRAM(s)       The patient had a clean appearing surgical incision with no sign of surgical site infections and had a stable neuro / vascular exam of the operated extremity.  After progression of mobility guided by the PT/ OT staff,  the patient was felt to benefit from further rehabilitative care for restoration to level of function. This was felt to best be accomplished at Home.    Discharge and Orthopedic Care instructions were delineated in the Discharge Plan and reviewed with the patient. All medications were delineated in the medication reconciliation tool and key points were reviewed with the patient.     This patient was deemed stable from an Orthopedic & medical standpoint for discharge today.  She will follow up with Dr. TED Cormier for further Orthopedic care.     Patient Discharged with Following prescriptions:    apixaban 2.5 mg oral tablet: 1 tab(s) orally every 12 hours  apixaban 2.5 mg oral tablet: 1 tab(s) orally every 12 hours  celecoxib 200 mg oral capsule: 1 cap(s) orally every 12 hours  oxyCODONE 5 mg oral tablet: 1 tab(s) orally every 4 hours, As Needed -Pain  MDD:6  Reference #: [id number]   pantoprazole 40 mg oral delayed release tablet: 1 tab(s) orally once a day (before a meal)

## 2018-11-27 NOTE — DISCHARGE NOTE ADULT - ADDITIONAL INSTRUCTIONS
Follow up with Dr. Cormier on 12/10/18 at 10:00am. Follow up with Dr. Cormier on 12/10/18 at 10:00am.  Disposition  Victoriano ferrara A1c 5.8%

## 2018-11-27 NOTE — DISCHARGE NOTE ADULT - MEDICATION SUMMARY - MEDICATIONS TO TAKE
I will START or STAY ON the medications listed below when I get home from the hospital:    Bariatric Rolling Walker with 5 inch wheels  -- Dx: s/p Right Anterior THR  -- Indication: For ambulation assistance    Wide 3:1 Commode  -- Dx: s/p Right Anterior THR  -- Indication: For toileting    Raised Toilet Seat without Handles  -- Dx: s/p Right Anterior THR  -- Indication: For toileting    spironolactone 25 mg oral tablet  -- 1 tab(s) by mouth 2 times a day  -- Indication: For high blood pressure    acetaminophen 500 mg oral tablet  -- 2 tab(s) by mouth every 8 hours  -- Indication: For Pain    celecoxib 200 mg oral capsule  -- 1 cap(s) by mouth every 12 hours  -- Indication: For Pain    oxyCODONE 5 mg oral tablet  -- 1 tab(s) by mouth every 4 hours, As Needed -Pain  MDD:6  Reference #: [id number]   -- Indication: For Pain    olmesartan 20 mg oral tablet  -- 1 tab(s) by mouth once a day  -- Indication: For high blood pressure    apixaban 2.5 mg oral tablet  -- 1 tab(s) by mouth every 12 hours  -- Indication: For Prevention of blood clots    metFORMIN 500 mg oral tablet, extended release  -- 1 tab(s) by mouth once a day  -- Indication: For diabetes    allopurinol 300 mg oral tablet  -- 1 tab(s) by mouth once a day  -- Indication: For gout    Bystolic 2.5 mg oral tablet  -- 1 tab(s) by mouth once a day  -- Indication: For high blood preesure    bumetanide 1 mg oral tablet  -- 1 tab(s) by mouth once a day  -- Indication: For high blood pressure    senna oral tablet  -- 2 tab(s) by mouth once a day (at bedtime)  -- Indication: For constipation     docusate sodium 100 mg oral capsule  -- 1 cap(s) by mouth 3 times a day  -- Indication: For stool softener    polyethylene glycol 3350 oral powder for reconstitution  -- 17 gram(s) by mouth once a day, As needed, Constipation  -- Indication: For constipation    pantoprazole 40 mg oral delayed release tablet  -- 1 tab(s) by mouth once a day (before a meal)  -- Indication: For Prevention of ulcers    levothyroxine 200 mcg (0.2 mg) oral tablet  -- 1 tab(s) by mouth once a day  -- Indication: For thyroid disease

## 2018-11-27 NOTE — DISCHARGE NOTE ADULT - INSTRUCTIONS
Accuchecks before meals and at bedtime  Hypoglycemic/Hyperglycemic protocol as per PMD  Continue Consistent Carbohydrate diet

## 2018-11-27 NOTE — PROGRESS NOTE ADULT - ASSESSMENT
Patient is 62 yo female with hx of Obesity, Essential hypertension, gout, Hypothyroid, Primary osteoarthritis of right hip, Type 2 diabetes mellitus, and Venous insufficiency of lower extremity presenting with     1.  S/P Right Total hip replacement.  Continue with pain management, DVT proph, and wound care as per Ortho.  PT/OT  2. HTN.  Continue with Bystolic, ARBS, aldactone, and Hold Bumex  3.  Gout.  continue with allopurinol  4.  DM2.  Hold oral medications.  ISS, and Monitor POC.  HBA1C 5.8  5. Hypothyroid.  Continue with levothyroxine  6.  Left shoulder pain.  shoulder x-ray shows Calcific tendinopathy of the left rotator cuff..  Ortho to follow up   7.  Anemia.  Seems to be secondary to post-operative blood loss.  Asymptomatic.  Monitor H/H  Plan of care was discussed with patient, and brother in great details, All questions were answered to their satisfaction  Seems to understand, and in agreement Patient is 62 yo female with hx of Obesity, Essential hypertension, gout, Hypothyroid, Primary osteoarthritis of right hip, Type 2 diabetes mellitus, and Venous insufficiency of lower extremity presenting with     1.  S/P Right Total hip replacement.  Continue with pain management, DVT proph, and wound care as per Ortho.  PT/OT  2. HTN.  Continue with Bystolic, ARBS, aldactone, and Hold Bumex  3.  Gout.  continue with allopurinol  4.  DM2. Continue with metformin.  HBA1C 5.8  5. Hypothyroid.  Continue with levothyroxine  6.  Left shoulder pain.  shoulder x-ray shows Calcific tendinopathy of the left rotator cuff..  Ortho to follow up   7.  Anemia.  Seems to be secondary to post-operative blood loss.  Asymptomatic.  Monitor H/H  Plan of care was discussed with patient, and brother in great details, All questions were answered to their satisfaction  Seems to understand, and in agreement Patient is 62 yo female with hx of Obesity, Essential hypertension, gout, Hypothyroid, Primary osteoarthritis of right hip, Type 2 diabetes mellitus, and Venous insufficiency of lower extremity presenting with     1.  S/P Right Total hip replacement.  Continue with pain management, DVT proph, and wound care as per Ortho.  PT/OT  2. HTN.  Continue with Bystolic, ARBS, aldactone, and Hold Bumex  3.  Gout.  continue with allopurinol  4.  Pre-DM. Continue with metformin.  HBA1C 5.8  5. Hypothyroid.  Continue with levothyroxine  6.  Left shoulder pain.  shoulder x-ray shows Calcific tendinopathy of the left rotator cuff..  Ortho to follow up   7.  Anemia.  Seems to be secondary to post-operative blood loss.  Asymptomatic.  Monitor H/H  Plan of care was discussed with patient, and brother in great details, All questions were answered to their satisfaction  Seems to understand, and in agreement

## 2018-11-27 NOTE — DISCHARGE NOTE ADULT - PLAN OF CARE
Improve quality of life Your new total hip replacement requires proper care.  Your surgical care provider is your best resource for information.  Your Physical Therapy /Occupational Therapy will include Ambulation, Transfers , Stairs, ADLs (activities of daily living), range of motion, and isometrics.  Your participation is vital for the fullest recovery and best results.  You may bear full weight as tolerated with rolling walker, cane or assistive device.    TOTAL HIP PRECAUTIONS  No straight leg raise  No external rotation of hip when extended-standing or lying flat  No hyperextension of hip when standing (kickback).  Do not take a tub bath yet.   Do not resume driving until you have your surgeon’s permission.     Keep incision clean and dry.  Change the dressing daily if there is drainage noted.  When there is no drainage the wound may be open to air.   The wound is closed with either sutures (stiches) or Prineo (glued on mesh tape.)  Both sutures and Prineo are removed 2 weeks after surgery at rehab facility or in surgeon's office.  If there is no wet drainage you may shower and pat dry with a clean towel. Call your doctor if you experience:  • An increase in pain not controlled by pain medication or change in activity or  position.  • Temperature greater than 101° F.  • Redness, increased swelling or foul smelling drainage from or around the  incision.  • Numbness, tingling or a change in color or temperature of the operative leg.  • Call your doctor immediately if you experience chest pain, shortness of breath or calf pain.    For Constipation :   • Increase your water intake. Drink at least 8 glasses of water daily.  • Try adding fiber to your diet by eating fruits, vegetables and foods that are rich in grains.  • If you do experience constipation, you may take an over-the-counter stool softener/laxative such as Colace, Senokot or Milk of Magnesia.

## 2018-11-27 NOTE — DISCHARGE NOTE ADULT - PATIENT PORTAL LINK FT
You can access the [facility], offered by [facility], by registering with the following website: [email or web address]

## 2018-11-27 NOTE — DISCHARGE NOTE ADULT - CARE PLAN
Principal Discharge DX:	Primary osteoarthritis of right hip  Goal:	Improve quality of life  Assessment and plan of treatment:	Your new total hip replacement requires proper care.  Your surgical care provider is your best resource for information.  Your Physical Therapy /Occupational Therapy will include Ambulation, Transfers , Stairs, ADLs (activities of daily living), range of motion, and isometrics.  Your participation is vital for the fullest recovery and best results.  You may bear full weight as tolerated with rolling walker, cane or assistive device.    TOTAL HIP PRECAUTIONS  No straight leg raise  No external rotation of hip when extended-standing or lying flat  No hyperextension of hip when standing (kickback).  Do not take a tub bath yet.   Do not resume driving until you have your surgeon’s permission.     Keep incision clean and dry.  Change the dressing daily if there is drainage noted.  When there is no drainage the wound may be open to air.   The wound is closed with either sutures (stiches) or Prineo (glued on mesh tape.)  Both sutures and Prineo are removed 2 weeks after surgery at rehab facility or in surgeon's office.  If there is no wet drainage you may shower and pat dry with a clean towel.  Assessment and plan of treatment:	Call your doctor if you experience:  • An increase in pain not controlled by pain medication or change in activity or  position.  • Temperature greater than 101° F.  • Redness, increased swelling or foul smelling drainage from or around the  incision.  • Numbness, tingling or a change in color or temperature of the operative leg.  • Call your doctor immediately if you experience chest pain, shortness of breath or calf pain.    For Constipation :   • Increase your water intake. Drink at least 8 glasses of water daily.  • Try adding fiber to your diet by eating fruits, vegetables and foods that are rich in grains.  • If you do experience constipation, you may take an over-the-counter stool softener/laxative such as Colace, Senokot or Milk of Magnesia.

## 2018-11-28 ENCOUNTER — INPATIENT (INPATIENT)
Facility: HOSPITAL | Age: 63
LOS: 6 days | Discharge: HOME HEALTH | DRG: 949 | End: 2018-12-05
Attending: PHYSICAL MEDICINE & REHABILITATION | Admitting: PHYSICAL MEDICINE & REHABILITATION
Payer: COMMERCIAL

## 2018-11-28 VITALS
RESPIRATION RATE: 16 BRPM | SYSTOLIC BLOOD PRESSURE: 130 MMHG | OXYGEN SATURATION: 99 % | TEMPERATURE: 98 F | DIASTOLIC BLOOD PRESSURE: 75 MMHG | HEART RATE: 69 BPM

## 2018-11-28 VITALS
RESPIRATION RATE: 15 BRPM | HEART RATE: 71 BPM | OXYGEN SATURATION: 98 % | TEMPERATURE: 98 F | SYSTOLIC BLOOD PRESSURE: 131 MMHG | DIASTOLIC BLOOD PRESSURE: 73 MMHG

## 2018-11-28 DIAGNOSIS — Z87.81 PERSONAL HISTORY OF (HEALED) TRAUMATIC FRACTURE: Chronic | ICD-10-CM

## 2018-11-28 DIAGNOSIS — Z98.890 OTHER SPECIFIED POSTPROCEDURAL STATES: Chronic | ICD-10-CM

## 2018-11-28 DIAGNOSIS — E89.0 POSTPROCEDURAL HYPOTHYROIDISM: Chronic | ICD-10-CM

## 2018-11-28 DIAGNOSIS — Z96.649 PRESENCE OF UNSPECIFIED ARTIFICIAL HIP JOINT: ICD-10-CM

## 2018-11-28 DIAGNOSIS — Z90.710 ACQUIRED ABSENCE OF BOTH CERVIX AND UTERUS: Chronic | ICD-10-CM

## 2018-11-28 LAB
ANION GAP SERPL CALC-SCNC: 8 MMOL/L — SIGNIFICANT CHANGE UP (ref 5–17)
BUN SERPL-MCNC: 27 MG/DL — HIGH (ref 7–23)
CALCIUM SERPL-MCNC: 9.1 MG/DL — SIGNIFICANT CHANGE UP (ref 8.4–10.5)
CHLORIDE SERPL-SCNC: 104 MMOL/L — SIGNIFICANT CHANGE UP (ref 96–108)
CO2 SERPL-SCNC: 30 MMOL/L — SIGNIFICANT CHANGE UP (ref 22–31)
CREAT SERPL-MCNC: 0.82 MG/DL — SIGNIFICANT CHANGE UP (ref 0.5–1.3)
GLUCOSE SERPL-MCNC: 99 MG/DL — SIGNIFICANT CHANGE UP (ref 70–99)
HCT VFR BLD CALC: 30.7 % — LOW (ref 34.5–45)
HGB BLD-MCNC: 9.5 G/DL — LOW (ref 11.5–15.5)
MCHC RBC-ENTMCNC: 26.7 PG — LOW (ref 27–34)
MCHC RBC-ENTMCNC: 30.9 GM/DL — LOW (ref 32–36)
MCV RBC AUTO: 86.2 FL — SIGNIFICANT CHANGE UP (ref 80–100)
NRBC # BLD: 0 /100 WBCS — SIGNIFICANT CHANGE UP (ref 0–0)
PLATELET # BLD AUTO: 237 K/UL — SIGNIFICANT CHANGE UP (ref 150–400)
POTASSIUM SERPL-MCNC: 4.3 MMOL/L — SIGNIFICANT CHANGE UP (ref 3.5–5.3)
POTASSIUM SERPL-SCNC: 4.3 MMOL/L — SIGNIFICANT CHANGE UP (ref 3.5–5.3)
RBC # BLD: 3.56 M/UL — LOW (ref 3.8–5.2)
RBC # FLD: 15.7 % — HIGH (ref 10.3–14.5)
SODIUM SERPL-SCNC: 142 MMOL/L — SIGNIFICANT CHANGE UP (ref 135–145)
WBC # BLD: 8.5 K/UL — SIGNIFICANT CHANGE UP (ref 3.8–10.5)
WBC # FLD AUTO: 8.5 K/UL — SIGNIFICANT CHANGE UP (ref 3.8–10.5)

## 2018-11-28 PROCEDURE — 73030 X-RAY EXAM OF SHOULDER: CPT

## 2018-11-28 PROCEDURE — 97110 THERAPEUTIC EXERCISES: CPT

## 2018-11-28 PROCEDURE — C1713: CPT

## 2018-11-28 PROCEDURE — C1776: CPT

## 2018-11-28 PROCEDURE — 36415 COLL VENOUS BLD VENIPUNCTURE: CPT

## 2018-11-28 PROCEDURE — 94664 DEMO&/EVAL PT USE INHALER: CPT

## 2018-11-28 PROCEDURE — 97530 THERAPEUTIC ACTIVITIES: CPT

## 2018-11-28 PROCEDURE — 99232 SBSQ HOSP IP/OBS MODERATE 35: CPT

## 2018-11-28 PROCEDURE — 80048 BASIC METABOLIC PNL TOTAL CA: CPT

## 2018-11-28 PROCEDURE — 82962 GLUCOSE BLOOD TEST: CPT

## 2018-11-28 PROCEDURE — 97161 PT EVAL LOW COMPLEX 20 MIN: CPT

## 2018-11-28 PROCEDURE — C1889: CPT

## 2018-11-28 PROCEDURE — 97165 OT EVAL LOW COMPLEX 30 MIN: CPT

## 2018-11-28 PROCEDURE — 97535 SELF CARE MNGMENT TRAINING: CPT

## 2018-11-28 PROCEDURE — 97116 GAIT TRAINING THERAPY: CPT

## 2018-11-28 PROCEDURE — 88305 TISSUE EXAM BY PATHOLOGIST: CPT

## 2018-11-28 PROCEDURE — 85027 COMPLETE CBC AUTOMATED: CPT

## 2018-11-28 PROCEDURE — 88311 DECALCIFY TISSUE: CPT

## 2018-11-28 PROCEDURE — 76000 FLUOROSCOPY <1 HR PHYS/QHP: CPT

## 2018-11-28 RX ORDER — PANTOPRAZOLE SODIUM 20 MG/1
40 TABLET, DELAYED RELEASE ORAL
Qty: 0 | Refills: 0 | Status: DISCONTINUED | OUTPATIENT
Start: 2018-11-28 | End: 2018-12-05

## 2018-11-28 RX ORDER — METFORMIN HYDROCHLORIDE 850 MG/1
500 TABLET ORAL DAILY
Qty: 0 | Refills: 0 | Status: DISCONTINUED | OUTPATIENT
Start: 2018-11-28 | End: 2018-12-05

## 2018-11-28 RX ORDER — LOSARTAN POTASSIUM 100 MG/1
50 TABLET, FILM COATED ORAL DAILY
Qty: 0 | Refills: 0 | Status: DISCONTINUED | OUTPATIENT
Start: 2018-11-28 | End: 2018-12-05

## 2018-11-28 RX ORDER — SPIRONOLACTONE 25 MG/1
25 TABLET, FILM COATED ORAL
Qty: 0 | Refills: 0 | Status: DISCONTINUED | OUTPATIENT
Start: 2018-11-28 | End: 2018-12-05

## 2018-11-28 RX ORDER — APIXABAN 2.5 MG/1
2.5 TABLET, FILM COATED ORAL EVERY 12 HOURS
Qty: 0 | Refills: 0 | Status: DISCONTINUED | OUTPATIENT
Start: 2018-11-28 | End: 2018-11-30

## 2018-11-28 RX ORDER — ALLOPURINOL 300 MG
300 TABLET ORAL DAILY
Qty: 0 | Refills: 0 | Status: DISCONTINUED | OUTPATIENT
Start: 2018-11-28 | End: 2018-12-05

## 2018-11-28 RX ORDER — MAGNESIUM HYDROXIDE 400 MG/1
30 TABLET, CHEWABLE ORAL DAILY
Qty: 0 | Refills: 0 | Status: DISCONTINUED | OUTPATIENT
Start: 2018-11-28 | End: 2018-12-05

## 2018-11-28 RX ORDER — NEBIVOLOL HYDROCHLORIDE 5 MG/1
2.5 TABLET ORAL DAILY
Qty: 0 | Refills: 0 | Status: DISCONTINUED | OUTPATIENT
Start: 2018-11-28 | End: 2018-12-05

## 2018-11-28 RX ORDER — DOCUSATE SODIUM 100 MG
100 CAPSULE ORAL THREE TIMES A DAY
Qty: 0 | Refills: 0 | Status: DISCONTINUED | OUTPATIENT
Start: 2018-11-28 | End: 2018-12-05

## 2018-11-28 RX ORDER — OXYCODONE HYDROCHLORIDE 5 MG/1
10 TABLET ORAL
Qty: 0 | Refills: 0 | Status: DISCONTINUED | OUTPATIENT
Start: 2018-11-28 | End: 2018-11-29

## 2018-11-28 RX ORDER — ASCORBIC ACID 60 MG
500 TABLET,CHEWABLE ORAL
Qty: 0 | Refills: 0 | Status: DISCONTINUED | OUTPATIENT
Start: 2018-11-28 | End: 2018-12-05

## 2018-11-28 RX ORDER — LIDOCAINE 4 G/100G
2 CREAM TOPICAL DAILY
Qty: 0 | Refills: 0 | Status: DISCONTINUED | OUTPATIENT
Start: 2018-11-28 | End: 2018-12-05

## 2018-11-28 RX ORDER — SENNA PLUS 8.6 MG/1
2 TABLET ORAL AT BEDTIME
Qty: 0 | Refills: 0 | Status: DISCONTINUED | OUTPATIENT
Start: 2018-11-28 | End: 2018-12-05

## 2018-11-28 RX ORDER — ACETAMINOPHEN 500 MG
650 TABLET ORAL EVERY 6 HOURS
Qty: 0 | Refills: 0 | Status: DISCONTINUED | OUTPATIENT
Start: 2018-11-28 | End: 2018-12-05

## 2018-11-28 RX ORDER — LEVOTHYROXINE SODIUM 125 MCG
200 TABLET ORAL DAILY
Qty: 0 | Refills: 0 | Status: DISCONTINUED | OUTPATIENT
Start: 2018-11-28 | End: 2018-12-05

## 2018-11-28 RX ORDER — OXYCODONE HYDROCHLORIDE 5 MG/1
5 TABLET ORAL
Qty: 0 | Refills: 0 | Status: DISCONTINUED | OUTPATIENT
Start: 2018-11-28 | End: 2018-11-29

## 2018-11-28 RX ORDER — CELECOXIB 200 MG/1
200 CAPSULE ORAL EVERY 12 HOURS
Qty: 0 | Refills: 0 | Status: DISCONTINUED | OUTPATIENT
Start: 2018-11-28 | End: 2018-11-30

## 2018-11-28 RX ORDER — ZINC SULFATE TAB 220 MG (50 MG ZINC EQUIVALENT) 220 (50 ZN) MG
220 TAB ORAL DAILY
Qty: 0 | Refills: 0 | Status: DISCONTINUED | OUTPATIENT
Start: 2018-11-28 | End: 2018-12-05

## 2018-11-28 RX ORDER — POLYETHYLENE GLYCOL 3350 17 G/17G
17 POWDER, FOR SOLUTION ORAL DAILY
Qty: 0 | Refills: 0 | Status: DISCONTINUED | OUTPATIENT
Start: 2018-11-28 | End: 2018-11-29

## 2018-11-28 RX ADMIN — LOSARTAN POTASSIUM 50 MILLIGRAM(S): 100 TABLET, FILM COATED ORAL at 05:32

## 2018-11-28 RX ADMIN — Medication 100 MILLIGRAM(S): at 05:32

## 2018-11-28 RX ADMIN — NEBIVOLOL HYDROCHLORIDE 2.5 MILLIGRAM(S): 5 TABLET ORAL at 05:32

## 2018-11-28 RX ADMIN — CELECOXIB 200 MILLIGRAM(S): 200 CAPSULE ORAL at 10:36

## 2018-11-28 RX ADMIN — SENNA PLUS 2 TABLET(S): 8.6 TABLET ORAL at 22:28

## 2018-11-28 RX ADMIN — CELECOXIB 200 MILLIGRAM(S): 200 CAPSULE ORAL at 23:43

## 2018-11-28 RX ADMIN — Medication 300 MILLIGRAM(S): at 11:55

## 2018-11-28 RX ADMIN — METFORMIN HYDROCHLORIDE 500 MILLIGRAM(S): 850 TABLET ORAL at 11:55

## 2018-11-28 RX ADMIN — POLYETHYLENE GLYCOL 3350 17 GRAM(S): 17 POWDER, FOR SOLUTION ORAL at 22:44

## 2018-11-28 RX ADMIN — Medication 200 MICROGRAM(S): at 05:31

## 2018-11-28 RX ADMIN — OXYCODONE HYDROCHLORIDE 5 MILLIGRAM(S): 5 TABLET ORAL at 12:50

## 2018-11-28 RX ADMIN — OXYCODONE HYDROCHLORIDE 5 MILLIGRAM(S): 5 TABLET ORAL at 23:45

## 2018-11-28 RX ADMIN — PANTOPRAZOLE SODIUM 40 MILLIGRAM(S): 20 TABLET, DELAYED RELEASE ORAL at 05:45

## 2018-11-28 RX ADMIN — APIXABAN 2.5 MILLIGRAM(S): 2.5 TABLET, FILM COATED ORAL at 22:28

## 2018-11-28 RX ADMIN — SPIRONOLACTONE 25 MILLIGRAM(S): 25 TABLET, FILM COATED ORAL at 22:28

## 2018-11-28 RX ADMIN — Medication 1000 MILLIGRAM(S): at 05:43

## 2018-11-28 RX ADMIN — Medication 100 MILLIGRAM(S): at 22:28

## 2018-11-28 RX ADMIN — APIXABAN 2.5 MILLIGRAM(S): 2.5 TABLET, FILM COATED ORAL at 10:36

## 2018-11-28 RX ADMIN — Medication 1000 MILLIGRAM(S): at 05:31

## 2018-11-28 RX ADMIN — CELECOXIB 200 MILLIGRAM(S): 200 CAPSULE ORAL at 22:28

## 2018-11-28 RX ADMIN — CELECOXIB 200 MILLIGRAM(S): 200 CAPSULE ORAL at 10:35

## 2018-11-28 RX ADMIN — OXYCODONE HYDROCHLORIDE 5 MILLIGRAM(S): 5 TABLET ORAL at 11:55

## 2018-11-28 RX ADMIN — OXYCODONE HYDROCHLORIDE 5 MILLIGRAM(S): 5 TABLET ORAL at 22:44

## 2018-11-28 NOTE — PHARMACOTHERAPY INTERVENTION NOTE - COMMENTS
Transition of Care education at bedside - medication calendar given to patient -- pharmacy fill confirmed

## 2018-11-28 NOTE — H&P ADULT - NSHPLABSRESULTS_GEN_ALL_CORE
.  LABS:                         9.5    8.50  )-----------( 237      ( 28 Nov 2018 07:47 )             30.7     11-28    142  |  104  |  27<H>  ----------------------------<  99  4.3   |  30  |  0.82    Ca    9.1      28 Nov 2018 07:47      RADIOLOGY, EKG & ADDITIONAL TESTS: Reviewed.  < from: Xray Shoulder 2 Views, Left (11.27.18 @ 10:20) >    Findings: No acute fractures or dislocations are noted. There is a 9 mm   oblong shaped calcification adjacent to the humerus head posteriorly best   seen on the Y view. Otherwise, the imaged joint spaces and soft tissues   are within normal limits.    IMPRESSION: No acute fractures or dislocations.    Calcific tendinopathy of the left rotator cuff. .  LABS:                         10.5   9.7   )-----------( 254      ( 29 Nov 2018 07:00 )             33.8   11-29    141  |  102  |  22  ----------------------------<  102<H>  4.1   |  31  |  0.69    Ca    9.5      29 Nov 2018 07:00    TPro  7.3  /  Alb  2.9<L>  /  TBili  0.4  /  DBili  x   /  AST  24  /  ALT  19  /  AlkPhos  60  11-29                          9.5    8.50  )-----------( 237      ( 28 Nov 2018 07:47 )             30.7     11-28    142  |  104  |  27<H>  ----------------------------<  99  4.3   |  30  |  0.82    Ca    9.1      28 Nov 2018 07:47      RADIOLOGY, EKG & ADDITIONAL TESTS: Reviewed.  < from: Xray Shoulder 2 Views, Left (11.27.18 @ 10:20) >    Findings: No acute fractures or dislocations are noted. There is a 9 mm   oblong shaped calcification adjacent to the humerus head posteriorly best   seen on the Y view. Otherwise, the imaged joint spaces and soft tissues   are within normal limits.    IMPRESSION: No acute fractures or dislocations.    Calcific tendinopathy of the left rotator cuff.

## 2018-11-28 NOTE — H&P ADULT - NSHPSOCIALHISTORY_GEN_ALL_CORE
SOCIAL HISTORY  Smoking - Denied  EtOH - Denied   Drugs - Denied    FUNCTIONAL HISTORY  Pt lives with brother in house. Post discharge, pt will be using back door with 1+3 MAO with handrails. Pt will be staying on first floor of house and will have assistance from brother. Pt used 2 straight canes for ambulation at home. Pt has rolling walker and wheelchair as well.  Prior Level of Fuction: Independent prior with Amubulation 2 SAC and ADLs.     CURRENT FUNCTIONAL STATUS  - Bed Mobility: min to mod assist   - Transfers: min assist   - Gait: min assist   - ADLs: mod assist bathing

## 2018-11-28 NOTE — H&P ADULT - NSHPPHYSICALEXAM_GEN_ALL_CORE
Vital Signs  T(C): 36.6 (11-28-18 @ 15:00), Max: 36.9 (11-27-18 @ 19:00)  HR: 69 (11-28-18 @ 15:00) (57 - 82)  BP: 130/75 (11-28-18 @ 15:00) (99/63 - 130/75)  RR: 16 (11-28-18 @ 15:00) (16 - 18)  SpO2: 99% (11-28-18 @ 15:00) (95% - 99%)    Gen - NAD, Comfortable  HEENT - NCAT, EOMI, MMM  Neck - Supple, No limited ROM  Pulm - CTAB, No wheeze, No rhonchi, No crackles  Cardiovascular - RRR, S1S2, No murmurs  Abdomen - Soft, NT/ND, +BS  Extremities - No C/C/E, No calf tenderness  Neuro-     Cognitive - AAOx3     Communication - Fluent, No dysarthria     Attention: Intact      Judgement: Good evidence of judgement     Memory: Recall 3 objects immediate and 3 min later         Cranial Nerves - CN 2-12 intact     Motor - No focal deficits                    LEFT    UE - ShAB 5/5, EF 5/5, EE 5/5, WE 5/5,  5/5                    RIGHT UE - ShAB 5/5, EF 5/5, EE 5/5, WE 5/5,  5/5                    LEFT    LE - HF 5/5, KE 5/5, DF 5/5, PF 5/5                    RIGHT LE - HF 5/5, KE 5/5, DF 5/5, PF 5/5        Sensory - Intact to LT     Reflexes - DTR Intact, No primitive reflexive     Coordination - FTN intact     Tone - normal  Psychiatric - Mood stable, Affect WNL  Skin:  all skin intact    Wounds: None Present Vital Signs Last 24 Hrs  T(C): 36.9 (28 Nov 2018 20:47), Max: 36.9 (28 Nov 2018 20:47)  T(F): 98.5 (28 Nov 2018 20:47), Max: 98.5 (28 Nov 2018 20:47)  HR: 68 (28 Nov 2018 20:47) (57 - 82)  BP: 130/68 (28 Nov 2018 20:47) (99/63 - 131/73)  BP(mean): --  RR: 15 (28 Nov 2018 20:47) (15 - 18)  SpO2: 95% (28 Nov 2018 20:47) (95% - 99%)    Gen - NAD, Comfortable  HEENT - NCAT, EOMI, MMM  Neck - Supple, No limited ROM  Pulm - CTAB, No wheeze, No rhonchi, No crackles  Cardiovascular - RRR, S1S2, No murmurs  Abdomen - Soft, NT/ND, +BS  Extremities - No C/C/E, No calf tenderness  Neuro-     Cognitive - AAOx3     Communication - Fluent, No dysarthria     Attention: Intact      Judgement: Good evidence of judgement     Memory: Recall 3 objects immediate and 3 min later         Cranial Nerves - CN 2-12 intact     Motor - No focal deficits                    LEFT    UE - ShAB 5/5, EF 5/5, EE 5/5, WE 5/5,  5/5                    RIGHT UE - ShAB 5/5, EF 5/5, EE 5/5, WE 5/5,  5/5                    LEFT    LE - HF 5/5, KE 5/5, DF 5/5, PF 5/5                    RIGHT LE - HF 5/5, KE 5/5, DF 5/5, PF 5/5        Sensory - Intact to LT     Reflexes - DTR Intact, No primitive reflexive     Coordination - FTN intact     Tone - normal  Psychiatric - Mood stable, Affect WNL  Skin:  all skin intact    Wounds: None Present Vital Signs Last 24 Hrs  T(C): 36.9 (28 Nov 2018 20:47), Max: 36.9 (28 Nov 2018 20:47)  T(F): 98.5 (28 Nov 2018 20:47), Max: 98.5 (28 Nov 2018 20:47)  HR: 68 (28 Nov 2018 20:47) (57 - 82)  BP: 130/68 (28 Nov 2018 20:47) (99/63 - 131/73)  BP(mean): --  RR: 15 (28 Nov 2018 20:47) (15 - 18)  SpO2: 95% (28 Nov 2018 20:47) (95% - 99%)    Gen - NAD, Comfortable  HEENT - NCAT, EOMI, MMM  Neck - Supple, No limited ROM  Pulm - CTAB, No wheeze, No rhonchi, No crackles  Cardiovascular - RRR, S1S2, No murmurs  Abdomen - Obese, Soft, NT/ND, +BS  Extremities - Mild RLE edema  Neuro-     Cognitive - AAOx3     Communication - Fluent, No dysarthria     Attention: Intact      Judgement: Good evidence of judgement        Cranial Nerves - CN grossly 2-12 intact     Motor - No focal deficits                    LEFT    UE - ShAB 5/5, EF 5/5, EE 5/5, WE 5/5,  5/5                    RIGHT UE - ShAB 5/5, EF 5/5, EE 5/5, WE 5/5,  5/5                    LEFT    LE - HF 5/5, KE 5/5, DF 5/5, PF 5/5                    RIGHT LE - HF <2/5 due to pain, KE <2/5 due to pain, DF 5/5, PF 5/5        Sensory - Reduced to LT over proximal lateral thigh near incisions  Psychiatric - Mood stable, Affect WNL  Skin:  Right hip incision C/D/I +Prineo tape, sutures   Wounds: as above Vital Signs Last 24 Hrs  T(C): 36.9 (28 Nov 2018 20:47), Max: 36.9 (28 Nov 2018 20:47)  T(F): 98.5 (28 Nov 2018 20:47), Max: 98.5 (28 Nov 2018 20:47)  HR: 74 (29 Nov 2018 06:57) (68 - 82)  BP: 131/83 (29 Nov 2018 06:57) (119/69 - 131/83)  BP(mean): --  RR: 15 (28 Nov 2018 20:47) (15 - 18)  SpO2: 95% (28 Nov 2018 20:47) (95% - 99%)    Gen - NAD, Comfortable  HEENT - NCAT, EOMI, MMM  Neck - Supple, No limited ROM  Pulm - CTAB, No wheeze, No rhonchi, No crackles  Cardiovascular - RRR, S1S2, No murmurs  Abdomen - Soft, NT/ND, +BS  Extremities - Mild RLE edema  Neuro-     Cognitive - AAOx3     Communication - Fluent, No dysarthria     Attention: Intact      Judgement: Good evidence of judgement        Cranial Nerves - CN grossly 2-12 intact     Motor - No focal deficits                    LEFT    UE - ShAB 5/5, EF 5/5, EE 5/5, WE 5/5,  5/5                    RIGHT UE - ShAB 5/5, EF 5/5, EE 5/5, WE 5/5,  5/5                    LEFT    LE - HF 5/5, KE 5/5, DF 5/5, PF 5/5                    RIGHT LE - HF <2/5 due to pain, KE <2/5 due to pain, DF 5/5, PF 5/5        Sensory - Reduced to LT over proximal lateral thigh near incisions  Psychiatric - Mood stable, Affect WNL  Skin:  Right hip incision C/D/I +Prineo tape, sutures with DSD  Wounds: as above

## 2018-11-28 NOTE — H&P ADULT - ATTENDING COMMENTS
Agree with above.  Pt is a 63 year old F with PMH Morbid Obesity, HTN, gout, Hypothyroid, prediabetes, primary osteoarthritis of the right hip s/p elective R THR on 11/26 now with functional, ADL, gait impairment.  Pt requires comprehensive rehab and physician management of comorbidities.

## 2018-11-28 NOTE — H&P ADULT - NSHPREVIEWOFSYSTEMS_GEN_ALL_CORE
REVIEW OF SYSTEMS  Constitutional: No fever, No Chills, No fatigue  HEENT: No eye pain, No visual disturbances, No difficulty hearing, No Dysphagia   Pulm: No cough,  No shortness of breath  Cardio: No chest pain, No palpitations  GI:  No abdominal pain, No nausea, No vomiting, No diarrhea, No constipation, No incontinence, Last Bowel Movemement on   : No dysuria, No frequency, No hematuria, No incontinence  Neuro: No headaches, No memory loss, No loss of strength, No numbness, No tremors  Skin: No itching, No rashes, No lesions   Endo: No temperature intolerance  MSK: No joint pain, No joint swelling, No muscle pain, No Neck or back pain  Psych:  No depression, No anxiety    Any Major Surgery within past 100 days?  Yes     Two or more Falls within past one year?  No                    One Fall with injury past one year?           No REVIEW OF SYSTEMS  Constitutional: No fever  HEENT: No eye pain, No visual disturbances   Pulm: No cough,  No shortness of breath  Cardio: No chest pain, No palpitations  GI:  No abdominal pain, No nausea, No vomiting, Last BM 3 days ago, +Flatus    : No dysuria, No frequency, No hematuria, No incontinence  Neuro: +Right proximal/lateral thigh numbness. +RLE weakness due to pain, No headaches, No memory loss, No loss of strength, No numbness, No tremors  Skin: No itching, No rashes, No lesions   Endo: No temperature intolerance  MSK: Mild Left shoulder pain with loading/activity; +R hip pain 8/10 with ambulation, 2/10 at rest;   No joint swelling, No muscle pain, No Neck or back pain  Psych:  No depression, No anxiety    Any Major Surgery within past 100 days?  Yes     Two or more Falls within past one year?  No                    One Fall with injury past one year?           No REVIEW OF SYSTEMS  Constitutional: No fever  HEENT: No eye pain, No visual disturbances   Pulm: No cough,  No shortness of breath  Cardio: No chest pain, No palpitations  GI:  No abdominal pain, No nausea, No vomiting, Last 11/25, +Flatus    : No dysuria, No frequency, No hematuria, No incontinence  Neuro: +Right proximal/lateral thigh numbness. +RLE weakness due to pain, No headaches, No memory loss, No loss of strength, No numbness, No tremors  Skin: No itching, No rashes, No lesions   Endo: No temperature intolerance  MSK: Mild Left shoulder pain with loading/activity; +R hip pain 8/10 with ambulation, 2/10 at rest;   No joint swelling, No muscle pain, No Neck or back pain  Psych:  No depression, No anxiety    Any Major Surgery within past 100 days?  Yes     Two or more Falls within past one year?  No                    One Fall with injury past one year?           No

## 2018-11-28 NOTE — H&P ADULT - HISTORY OF PRESENT ILLNESS
63 year old F with PMH Obesity, HTN, gout, Hypothyroid, T2DM, primary osteoarthritis of the right hip who failed conservative measures underwent uncomplicated elective right total hip arthroplasty (anterior approach) on 11/26 with Dr. Cormier.  Post op made WBAT.  Started on Eliquis for DVT prophylaxis for 35 days (until 1/1/19).  Patient noted to have left shoulder pain and X-ray shoed calcific tendinopathy of left rotator cuff with plan for ortho follow up.  Deemed medically stable for acute rehab.

## 2018-11-28 NOTE — PROGRESS NOTE ADULT - SUBJECTIVE AND OBJECTIVE BOX
CC.  S/P Right Total hip replacement  HPI.  Patient is 64 yo female with hx of HTN, and DM2 presenting with s/p right Total hip replacement.  Pain well controlled.    Patient reports left shoulder pain is controlled.    Offers no other complaints      Constitutional: No fever, fatigue or weight loss.  Skin: No rash.  Eyes: No recent vision problems or eye pain.  ENT: No congestion, ear pain, or sore throat.  Endocrine: No thyroid problems.  Cardiovascular: No chest pain or palpation.  Respiratory: No cough, shortness of breath, congestion, or wheezing.  Gastrointestinal: No abdominal pain, nausea, vomiting, or diarrhea.  Genitourinary: No dysuria.  Musculoskeletal: No joint swelling.  Neurologic: No headache.      Vital Signs Last 24 Hrs  T(C): 36.5 (28 Nov 2018 11:48), Max: 36.9 (27 Nov 2018 19:00)  T(F): 97.7 (28 Nov 2018 11:48), Max: 98.5 (27 Nov 2018 19:00)  HR: 82 (28 Nov 2018 11:48) (57 - 82)  BP: 119/69 (28 Nov 2018 11:48) (99/63 - 130/68)  BP(mean): --  RR: 18 (28 Nov 2018 11:48) (18 - 18)  SpO2: 95% (28 Nov 2018 11:48) (95% - 98%)    PHYSICAL EXAM-  GENERAL: NAD, well-groomed, well-developed  HEAD:  Atraumatic, Normocephalic  EYES: EOMI, PERRLA, conjunctiva and sclera clear  NECK: Supple, No JVD, Normal thyroid  NERVOUS SYSTEM:  Alert & Oriented X3, Motor Strength 5/5 B/L upper and lower extremities; DTRs 2+ intact and symmetric  CHEST/LUNG: Clear to percussion bilaterally; No rales, rhonchi, wheezing, or rubs  HEART: Regular rate and rhythm; No murmurs, rubs, or gallops  ABDOMEN: Soft, Nontender, Nondistended; Bowel sounds present  EXTREMITIES:  2+ Peripheral Pulses, No clubbing, cyanosis, or edema  SKIN: No rashes or lesions                                9.5    8.50  )-----------( 237      ( 28 Nov 2018 07:47 )             30.7     11-28    142  |  104  |  27<H>  ----------------------------<  99  4.3   |  30  |  0.82    Ca    9.1      28 Nov 2018 07:47                  MEDICATIONS  (STANDING):  acetaminophen   Tablet .. 1000 milliGRAM(s) Oral every 8 hours  allopurinol 300 milliGRAM(s) Oral daily  apixaban 2.5 milliGRAM(s) Oral every 12 hours  celecoxib 200 milliGRAM(s) Oral every 12 hours  dextrose 5%. 1000 milliLiter(s) (50 mL/Hr) IV Continuous <Continuous>  dextrose 50% Injectable 12.5 Gram(s) IV Push once  dextrose 50% Injectable 25 Gram(s) IV Push once  dextrose 50% Injectable 25 Gram(s) IV Push once  docusate sodium 100 milliGRAM(s) Oral three times a day  levothyroxine 200 MICROGram(s) Oral daily  losartan 50 milliGRAM(s) Oral daily  metFORMIN Extended-Release 500 milliGRAM(s) Oral daily  nebivolol 2.5 milliGRAM(s) Oral daily  pantoprazole    Tablet 40 milliGRAM(s) Oral before breakfast  senna 2 Tablet(s) Oral at bedtime    MEDICATIONS  (PRN):  aluminum hydroxide/magnesium hydroxide/simethicone Suspension 30 milliLiter(s) Oral four times a day PRN Indigestion  bisacodyl Suppository 10 milliGRAM(s) Rectal daily PRN If no bowel movement by POD#2  dextrose 40% Gel 15 Gram(s) Oral once PRN Blood Glucose LESS THAN 70 milliGRAM(s)/deciliter  glucagon  Injectable 1 milliGRAM(s) IntraMuscular once PRN Glucose LESS THAN 70 milligrams/deciliter  HYDROmorphone  Injectable 0.5 milliGRAM(s) IV Push every 3 hours PRN Severe Pain (7 - 10)  magnesium hydroxide Suspension 30 milliLiter(s) Oral daily PRN Constipation  ondansetron Injectable 4 milliGRAM(s) IV Push every 6 hours PRN Nausea and/or Vomiting  oxyCODONE    IR 5 milliGRAM(s) Oral every 3 hours PRN Mild Pain (1 - 3)  oxyCODONE    IR 10 milliGRAM(s) Oral every 3 hours PRN Moderate Pain (4 - 6)  polyethylene glycol 3350 17 Gram(s) Oral daily PRN Constipation    Imaging Personally Reviewed:     [x ] YES  [ ] NO    Consultant(s) Notes Reviewed:  [x ] YES  [ ] NO    Care Discussed with Consultants/Other Providers [x ] YES  [ ] NO
Discharge medication calendar:  Eliquis 2.5mg q12h x 35 days  APAP 1000mg q8h x 2-3 weeks  Celecoxib 200mg q12h x 21 days  Omeprazole 40mg Qday (home med)  Narcotic PRN  Docusate 100mg TID while taking narcotic  Miralax, Senna, or Bisacodyl PRN for treatment of constipation
Post Op Day # 1    SUBJECTIVE    62yo Female status post Right ant THR .   Patient is alert and comfortable.    Pain is controlled with current pain regimen.  Denies nausea, vomiting, chest pain, shortness of breath, abdominal pain or fever.   No new complaints.    OBJECTIVE    Vital Signs Last 24 Hrs  T(C): 36.4 (27 Nov 2018 07:56), Max: 36.8 (26 Nov 2018 19:14)  T(F): 97.5 (27 Nov 2018 07:56), Max: 98.3 (26 Nov 2018 19:14)  HR: 42 (27 Nov 2018 07:56) (42 - 66)  BP: 125/57 (27 Nov 2018 07:56) (98/58 - 135/75)  BP(mean): --  RR: 18 (27 Nov 2018 07:56) (8 - 18)  SpO2: 96% (27 Nov 2018 07:56) (93% - 100%)  I&O's Summary    26 Nov 2018 07:01  -  27 Nov 2018 07:00  --------------------------------------------------------  IN: 2320 mL / OUT: 1630 mL / NET: 690 mL        PHYSICAL EXAM    Right Hip dressing is clean, dry and intact.   The calf is supple/nontender.   Passive range of motion is acceptable to due postoperative pain.   Sensation to light touch is grossly intact distally.   The lateral cutaneous nerve is intact.   Motor function distally is intact.   No foot drop.   (2+) dorsalis pedis pulse. Capillary refill is less than 2 seconds. No cyanosis.                          9.9<L>  11.53<H> )-----------( 249      ( 27 Nov 2018 07:42 )             33.2<L>  27 Nov 2018 07:42                        10.7<L>  13.16<H> )-----------( 268      ( 26 Nov 2018 17:33 )             36.1   26 Nov 2018 17:33    27 Nov 2018 07:42    139    |  102    |  27<H>  ----------------------------<  113<H>  4.5     |  30     |  0.82   26 Nov 2018 17:33    140    |  102    |  28<H>  ----------------------------<  148<H>  4.8     |  30     |  0.84     Ca    9.2        27 Nov 2018 07:42  Ca    9.2        26 Nov 2018 17:33        ASSESSMENT AND PLAN    - Orthopedically stable  - DVT prophylaxis: PAS + Eliquis  -  HO prophylaxis: Celebrex 200mg PO twice daily x21 days  - Continue physical therapy and occupational therapy  - Weight bearing as tolerated of the right lower extremity with assistance of a walker  - Incentive spirometry encouraged  - Pain control as clinically indicated  - Disposition: Home when medically stable and cleared by PT/OT
SLICK AUGUSTIN                                                                [id number]                                                      Allergies---No Known Allergies         Pt is a 63y year old Female s/p Right THR.   The patient is A&O x 3, resting comfortably in bed, with no complaints.   Denies any chest pain / shortness of breath / dyspnea / nausea / vomiting / headaches or light headed ness.   Pain is tolerable.   Pain is 1/10.           T(C): 36.3 (11-26-18 @ 14:00), Max: 36.8 (11-26-18 @ 06:57)  HR: 54 (11-26-18 @ 14:00) (44 - 67)  BP: 120/62 (11-26-18 @ 14:00) (105/61 - 148/65)  RR: 16 (11-26-18 @ 14:00) (8 - 17)  SpO2: 98% (11-26-18 @ 14:00) (94% - 100%)  Wt(kg): --    I&O's Detail    26 Nov 2018 07:01  -  26 Nov 2018 15:42  --------------------------------------------------------  IN:    lactated ringers.: 1500 mL    Oral Fluid: 120 mL  Total IN: 1620 mL    OUT:    Estimated Blood Loss: 220 mL  Total OUT: 220 mL    Total NET: 1400 mL        PE:   Right Lower Extremity:   Dressing is C/D/I, fixated well on the patient.   Neurovascularly intact.   No gross evidence of motor or sensory deficits.   EHL/FHL/TA intact.   +2 DP/PT pulses.   Toes are pink and mobile.   Capillary refill < 2 seconds.   PAS in place.   HV in place.        A:   Pt is a 63y year old Female s/p right total hip replacement, Post Op Day #0        Plan:    - Medicine to follow    - OOB with PT/OT   - Anterior Hip precautions   - Pain control    - Continue antibiotics as per SCIP protocol   - Incentive spirometry   - PAS stockings   - Follow up with lab results as well as Labs in A.M.   - Close monitoring  - DVT ppx =                                                                                                                                                                            Dane LINDQUIST
SUBJECTIVE: Patient seen and examined. No nausea/vomiting, nor shortness of breath.     OBJECTIVE:     Vital Signs Last 24 Hrs  T(C): 36.8 (28 Nov 2018 03:00), Max: 36.9 (27 Nov 2018 19:00)  T(F): 98.3 (28 Nov 2018 03:00), Max: 98.5 (27 Nov 2018 19:00)  HR: 71 (28 Nov 2018 05:30) (42 - 71)  BP: 130/68 (28 Nov 2018 05:30) (99/62 - 130/68)  BP(mean): --  RR: 18 (28 Nov 2018 03:00) (18 - 18)  SpO2: 95% (28 Nov 2018 03:00) (94% - 98%)    PAIN SCORE:     2    SCALE USED: (1-10 VNRS)        Affected extremity:          Dressing: clean/dry/intact surgical incision dressing with no drainage on the ABD. Hemovac drainsite saturated with bloody drainage, drain was out from yesterday. No erythema. Prineo tape on      the surgical incision         Sensation:  intact to light touch. Calves are supple and Nontender         Motor exam:          5/ 5 Tibialis Anterior/Gastrocnemius-Soleus , EHL         warm well perfused; capillary refill <3 seconds     LABS:                        9.9    11.53 )-----------( 249      ( 27 Nov 2018 07:42 )             33.2     11-27    139  |  102  |  27<H>  ----------------------------<  113<H>  4.5   |  30  |  0.82    Ca    9.2      27 Nov 2018 07:42        CAPILLARY BLOOD GLUCOSE      POCT Blood Glucose.: 138 mg/dL (27 Nov 2018 21:27)      MEDICATIONS:    Anticoagulation:  apixaban 2.5 milliGRAM(s) Oral every 12 hours      Antibiotics: finished      Pain medications:   acetaminophen   Tablet .. 1000 milliGRAM(s) Oral every 8 hours  celecoxib 200 milliGRAM(s) Oral every 12 hours  HYDROmorphone  Injectable 0.5 milliGRAM(s) IV Push every 3 hours PRN  ondansetron Injectable 4 milliGRAM(s) IV Push every 6 hours PRN  oxyCODONE    IR 5 milliGRAM(s) Oral every 3 hours PRN  oxyCODONE    IR 10 milliGRAM(s) Oral every 3 hours PRN      A/P :  s/p Right THR   POD # 2  -    Pain control  -    DVT ppx: Eliquis  -    Check AM labs  -    Weight bearing status: WBAT   -    Physical Therapy  -    Dispo: Home and explained to patient on continued dressing changes as long as there is drainage that continues from the drain site.
CC.  S/P Right Total hip replacement  HPI.  Patient is 64 yo female with hx of HTN, and DM2 presenting with s/p right Total hip replacement.  Pain well controlled.    Patient is complaining of left shoulder pain that has been going on for the past several weeks, and contribute to using the cane    Offers no other complaints      Constitutional: No fever, fatigue or weight loss.  Skin: No rash.  Eyes: No recent vision problems or eye pain.  ENT: No congestion, ear pain, or sore throat.  Endocrine: No thyroid problems.  Cardiovascular: No chest pain or palpation.  Respiratory: No cough, shortness of breath, congestion, or wheezing.  Gastrointestinal: No abdominal pain, nausea, vomiting, or diarrhea.  Genitourinary: No dysuria.  Musculoskeletal: No joint swelling.  Neurologic: No headache.      Vital Signs Last 24 Hrs  T(C): 36.4 (27 Nov 2018 07:56), Max: 36.8 (26 Nov 2018 19:14)  T(F): 97.5 (27 Nov 2018 07:56), Max: 98.3 (26 Nov 2018 19:14)  HR: 42 (27 Nov 2018 07:56) (42 - 66)  BP: 125/57 (27 Nov 2018 07:56) (98/58 - 135/75)  BP(mean): --  RR: 18 (27 Nov 2018 07:56) (8 - 18)  SpO2: 96% (27 Nov 2018 07:56) (93% - 100%)      PHYSICAL EXAM-  GENERAL: NAD, well-groomed, well-developed  HEAD:  Atraumatic, Normocephalic  EYES: EOMI, PERRLA, conjunctiva and sclera clear  NECK: Supple, No JVD, Normal thyroid  NERVOUS SYSTEM:  Alert & Oriented X3, Motor Strength 5/5 B/L upper and lower extremities; DTRs 2+ intact and symmetric  CHEST/LUNG: Clear to percussion bilaterally; No rales, rhonchi, wheezing, or rubs  HEART: Regular rate and rhythm; No murmurs, rubs, or gallops  ABDOMEN: Soft, Nontender, Nondistended; Bowel sounds present  EXTREMITIES:  2+ Peripheral Pulses, No clubbing, cyanosis, or edema  SKIN: No rashes or lesions                          9.9    11.53 )-----------( 249      ( 27 Nov 2018 07:42 )             33.2     11-27    139  |  102  |  27<H>  ----------------------------<  113<H>  4.5   |  30  |  0.82    Ca    9.2      27 Nov 2018 07:42              MEDICATIONS  (STANDING):  acetaminophen   Tablet .. 1000 milliGRAM(s) Oral every 8 hours  allopurinol 300 milliGRAM(s) Oral daily  apixaban 2.5 milliGRAM(s) Oral every 12 hours  celecoxib 200 milliGRAM(s) Oral every 12 hours  dextrose 5%. 1000 milliLiter(s) (50 mL/Hr) IV Continuous <Continuous>  dextrose 50% Injectable 12.5 Gram(s) IV Push once  dextrose 50% Injectable 25 Gram(s) IV Push once  dextrose 50% Injectable 25 Gram(s) IV Push once  docusate sodium 100 milliGRAM(s) Oral three times a day  insulin lispro (HumaLOG) corrective regimen sliding scale   SubCutaneous three times a day before meals  lactated ringers. 1000 milliLiter(s) (125 mL/Hr) IV Continuous <Continuous>  levothyroxine 200 MICROGram(s) Oral daily  metFORMIN Extended-Release 500 milliGRAM(s) Oral daily  nebivolol 2.5 milliGRAM(s) Oral daily  pantoprazole    Tablet 40 milliGRAM(s) Oral before breakfast  senna 2 Tablet(s) Oral at bedtime    MEDICATIONS  (PRN):  aluminum hydroxide/magnesium hydroxide/simethicone Suspension 30 milliLiter(s) Oral four times a day PRN Indigestion  dextrose 40% Gel 15 Gram(s) Oral once PRN Blood Glucose LESS THAN 70 milliGRAM(s)/deciliter  glucagon  Injectable 1 milliGRAM(s) IntraMuscular once PRN Glucose LESS THAN 70 milligrams/deciliter  HYDROmorphone  Injectable 0.5 milliGRAM(s) IV Push every 3 hours PRN Severe Pain (7 - 10)  magnesium hydroxide Suspension 30 milliLiter(s) Oral daily PRN Constipation  ondansetron Injectable 4 milliGRAM(s) IV Push every 6 hours PRN Nausea and/or Vomiting  oxyCODONE    IR 5 milliGRAM(s) Oral every 3 hours PRN Mild Pain (1 - 3)  oxyCODONE    IR 10 milliGRAM(s) Oral every 3 hours PRN Moderate Pain (4 - 6)  polyethylene glycol 3350 17 Gram(s) Oral daily PRN Constipation      Imaging Personally Reviewed:     [x ] YES  [ ] NO    Consultant(s) Notes Reviewed:  [x ] YES  [ ] NO    Care Discussed with Consultants/Other Providers [x ] YES  [ ] NO

## 2018-11-28 NOTE — H&P ADULT - ASSESSMENT
63 year old F with PMH Obesity, HTN, gout, Hypothyroid, T2DM, primary osteoarthritis of the right hip s/p elective R THR on 11/26 now with functional, ADL, gait impairment.      #s/p right THR   - start Comprehensive Rehab Program of PT/OT   - Total Hip Precautions  - Remove sutures and Prineo 2 weeks post op (12/10/18)   - Weight bearing status: WBAT   - Eliquis for DVT PPX until 1/1/19     #HTN    -  Losartan, Bystolic, Spironolactone     #Gout  - allopurinol    #Pre-DM (HBA1C 5.8)  - metformin  - CC diet     #Hypothyroid  - levothyroxine    Precautions / PROPHYLAXIS:   - Falls, Total Hip   - Lungs: Aspiration, Incentive Spirometer [patient instructed at the bedside]   - Pressure injury/Skin: Turn Q2hrs while in bed   - DVT: Eliquis      Diet:  Regular + Thins [[facility]        MEDICAL PROGNOSIS: GOOD                                   REHAB POTENTIAL: GOOD  ESTIMATED DISPOSITION: HOME                             ELOS: 10-14 Days   EXPECTED THERAPY:     P.T. 1hr/day       O.T. 1hr/day      S.L.P. 1hr/day     P&O Unnecessary     EXP FREQUENCY: 5 days per 7 day period     PRESCREEN COMPARISON I have reviewed the prescreen information and I have found no relevant changes between the preadmission screening and my post admission evaluation     RATIONALE FOR INPATIENT ADMISSION - Patient demonstrates the following: (check all that apply)  [X] Medically appropriate for rehabilitation admission  [X] Has attainable rehab goals with an appropriate initial discharge plan  [X] Has rehabilitation potential (expected to make a significant improvement within a reasonable period of time)   [X] Requires close medical management by a rehab physician, rehab nursing care, Hospitalist and comprehensive interdisciplinary team (including PT, OT, & or SLP, Prosthetics and Orthotics)      Discussed with on call Attending 63 year old F with PMH Obesity, HTN, gout, Hypothyroid, T2DM, primary osteoarthritis of the right hip s/p elective R THR on 11/26 now with functional, ADL, gait impairment.      #s/p right THR   - start Comprehensive Rehab Program of PT/OT   - Total Hip Precautions  - Celebrex for HO PPX x 21 days   - Remove sutures and Prineo 2 weeks post op (12/10/18)   - Weight bearing status: WBAT   - Eliquis for DVT PPX until 1/1/19     #Pain Control  - Tylenol PRN, Oxycodone PRN, Lidoderm patch     #HTN    -  Losartan, Bystolic, Spironolactone     #Gout  - allopurinol    #Pre-DM (HBA1C 5.8)  - metformin  - CC diet     #Hypothyroid  - levothyroxine    Precautions / PROPHYLAXIS:   - Falls, Total Hip   - Lungs: Aspiration, Incentive Spirometer [patient instructed at the bedside]   - Pressure injury/Skin: Turn Q2hrs while in bed   - DVT: Eliquis      Diet:  Regular + Thins [[facility]        MEDICAL PROGNOSIS: GOOD                                   REHAB POTENTIAL: GOOD  ESTIMATED DISPOSITION: HOME                             ELOS: 10-14 Days   EXPECTED THERAPY:     P.T. 1hr/day       O.T. 1hr/day      S.L.P. 1hr/day     P&O Unnecessary     EXP FREQUENCY: 5 days per 7 day period     PRESCREEN COMPARISON I have reviewed the prescreen information and I have found no relevant changes between the preadmission screening and my post admission evaluation     RATIONALE FOR INPATIENT ADMISSION - Patient demonstrates the following: (check all that apply)  [X] Medically appropriate for rehabilitation admission  [X] Has attainable rehab goals with an appropriate initial discharge plan  [X] Has rehabilitation potential (expected to make a significant improvement within a reasonable period of time)   [X] Requires close medical management by a rehab physician, rehab nursing care, Hospitalist and comprehensive interdisciplinary team (including PT, OT, & or SLP, Prosthetics and Orthotics)      Discussed with on call Attending 63 year old F with PMH Morbid Obesity, HTN, gout, Hypothyroid, T2DM, primary osteoarthritis of the right hip s/p elective R THR on 11/26 now with functional, ADL, gait impairment.      #s/p right THR   - start Comprehensive Rehab Program of PT/OT   - Anterior Total Hip Precautions.  No SLR.  No hip ER when extended.  No hyperextension of hip when standing  - Celebrex for HO PPX x 21 days   - Remove sutures and Prineo 2 weeks post op (12/10/18)   - Weight bearing status: WBAT   - Eliquis for DVT PPX until 1/1/19     #Pain Control  - Tylenol PRN, Oxycodone PRN, Lidoderm patch     #HTN    -  Losartan, Bystolic, Spironolactone     #Gout  - allopurinol    #Pre-DM (HBA1C 5.8)  - metformin  - CC diet     #Morbid obesity  -Monitor effect on rehab  -Metformin    #Hypothyroid  - levothyroxine    Precautions / PROPHYLAXIS:   - Falls, Total Hip   - Lungs: Aspiration, Incentive Spirometer [patient instructed at the bedside]   - Pressure injury/Skin: Turn Q2hrs while in bed   - DVT: Eliquis      Diet:  Regular + Thins [[facility]        MEDICAL PROGNOSIS: GOOD                                   REHAB POTENTIAL: GOOD  ESTIMATED DISPOSITION: HOME                             ELOS: 7-10Days   EXPECTED THERAPY:     P.T. 1.5hr/day       O.T. 1.5hr/day      S.L.P. 0    P&O Unnecessary     EXP FREQUENCY: 5 days per 7 day period     PRESCREEN COMPARISON I have reviewed the prescreen information and I have found no relevant changes between the preadmission screening and my post admission evaluation     RATIONALE FOR INPATIENT ADMISSION - Patient demonstrates the following: (check all that apply)  [X] Medically appropriate for rehabilitation admission  [X] Has attainable rehab goals with an appropriate initial discharge plan  [X] Has rehabilitation potential (expected to make a significant improvement within a reasonable period of time)   [X] Requires close medical management by a rehab physician, rehab nursing care, Hospitalist and comprehensive interdisciplinary team (including PT, OT, & or SLP, Prosthetics and Orthotics)      Discussed with on call Attending

## 2018-11-28 NOTE — PROGRESS NOTE ADULT - ASSESSMENT
Patient is 64 yo female with hx of Obesity, Essential hypertension, gout, Hypothyroid, Primary osteoarthritis of right hip, Type 2 diabetes mellitus, and Venous insufficiency of lower extremity presenting with     1.  S/P Right Total hip replacement.  Continue with pain management, DVT proph, and wound care as per Ortho.  PT/OT  2. HTN.  Continue with Bystolic, ARBS, aldactone, and Hold Bumex  3.  Gout.  continue with allopurinol  4.  Pre-DM. Continue with metformin.  HBA1C 5.8  5. Hypothyroid.  Continue with levothyroxine  6.  Left shoulder pain.  shoulder x-ray shows Calcific tendinopathy of the left rotator cuff..  Ortho to follow up   7.  Anemia.  Seems to be secondary to post-operative blood loss.  Asymptomatic.  Monitor H/H  Plan of care was discussed with patient, and brother in great details, All questions were answered to their satisfaction  Seems to understand, and in agreement

## 2018-11-29 DIAGNOSIS — Z96.641 PRESENCE OF RIGHT ARTIFICIAL HIP JOINT: ICD-10-CM

## 2018-11-29 LAB
ALBUMIN SERPL ELPH-MCNC: 2.9 G/DL — LOW (ref 3.3–5)
ALP SERPL-CCNC: 60 U/L — SIGNIFICANT CHANGE UP (ref 40–120)
ALT FLD-CCNC: 19 U/L DA — SIGNIFICANT CHANGE UP (ref 10–45)
ANION GAP SERPL CALC-SCNC: 8 MMOL/L — SIGNIFICANT CHANGE UP (ref 5–17)
AST SERPL-CCNC: 24 U/L — SIGNIFICANT CHANGE UP (ref 10–40)
BASOPHILS # BLD AUTO: 0.1 K/UL — SIGNIFICANT CHANGE UP (ref 0–0.2)
BASOPHILS NFR BLD AUTO: 0.9 % — SIGNIFICANT CHANGE UP (ref 0–2)
BILIRUB SERPL-MCNC: 0.4 MG/DL — SIGNIFICANT CHANGE UP (ref 0.2–1.2)
BUN SERPL-MCNC: 22 MG/DL — SIGNIFICANT CHANGE UP (ref 7–23)
CALCIUM SERPL-MCNC: 9.5 MG/DL — SIGNIFICANT CHANGE UP (ref 8.4–10.5)
CHLORIDE SERPL-SCNC: 102 MMOL/L — SIGNIFICANT CHANGE UP (ref 96–108)
CO2 SERPL-SCNC: 31 MMOL/L — SIGNIFICANT CHANGE UP (ref 22–31)
CREAT SERPL-MCNC: 0.69 MG/DL — SIGNIFICANT CHANGE UP (ref 0.5–1.3)
EOSINOPHIL # BLD AUTO: 0.3 K/UL — SIGNIFICANT CHANGE UP (ref 0–0.5)
EOSINOPHIL NFR BLD AUTO: 2.6 % — SIGNIFICANT CHANGE UP (ref 0–6)
GLUCOSE SERPL-MCNC: 102 MG/DL — HIGH (ref 70–99)
HCT VFR BLD CALC: 33.8 % — LOW (ref 34.5–45)
HGB BLD-MCNC: 10.5 G/DL — LOW (ref 11.5–15.5)
LYMPHOCYTES # BLD AUTO: 1.9 K/UL — SIGNIFICANT CHANGE UP (ref 1–3.3)
LYMPHOCYTES # BLD AUTO: 19.6 % — SIGNIFICANT CHANGE UP (ref 13–44)
MCHC RBC-ENTMCNC: 26.1 PG — LOW (ref 27–34)
MCHC RBC-ENTMCNC: 31 GM/DL — LOW (ref 32–36)
MCV RBC AUTO: 84.1 FL — SIGNIFICANT CHANGE UP (ref 80–100)
MONOCYTES # BLD AUTO: 0.7 K/UL — SIGNIFICANT CHANGE UP (ref 0–0.9)
MONOCYTES NFR BLD AUTO: 6.7 % — SIGNIFICANT CHANGE UP (ref 2–14)
NEUTROPHILS # BLD AUTO: 6.8 K/UL — SIGNIFICANT CHANGE UP (ref 1.8–7.4)
NEUTROPHILS NFR BLD AUTO: 70.2 % — SIGNIFICANT CHANGE UP (ref 43–77)
PLATELET # BLD AUTO: 254 K/UL — SIGNIFICANT CHANGE UP (ref 150–400)
POTASSIUM SERPL-MCNC: 4.1 MMOL/L — SIGNIFICANT CHANGE UP (ref 3.5–5.3)
POTASSIUM SERPL-SCNC: 4.1 MMOL/L — SIGNIFICANT CHANGE UP (ref 3.5–5.3)
PREALB SERPL-MCNC: 14 MG/DL — LOW (ref 20–40)
PROT SERPL-MCNC: 7.3 G/DL — SIGNIFICANT CHANGE UP (ref 6–8.3)
RBC # BLD: 4.03 M/UL — SIGNIFICANT CHANGE UP (ref 3.8–5.2)
RBC # FLD: 15.5 % — HIGH (ref 10.3–14.5)
SODIUM SERPL-SCNC: 141 MMOL/L — SIGNIFICANT CHANGE UP (ref 135–145)
WBC # BLD: 9.7 K/UL — SIGNIFICANT CHANGE UP (ref 3.8–10.5)
WBC # FLD AUTO: 9.7 K/UL — SIGNIFICANT CHANGE UP (ref 3.8–10.5)

## 2018-11-29 PROCEDURE — 99255 IP/OBS CONSLTJ NEW/EST HI 80: CPT

## 2018-11-29 PROCEDURE — 99222 1ST HOSP IP/OBS MODERATE 55: CPT | Mod: AI

## 2018-11-29 RX ORDER — POLYETHYLENE GLYCOL 3350 17 G/17G
17 POWDER, FOR SOLUTION ORAL DAILY
Qty: 0 | Refills: 0 | Status: DISCONTINUED | OUTPATIENT
Start: 2018-11-29 | End: 2018-12-05

## 2018-11-29 RX ADMIN — Medication 100 MILLIGRAM(S): at 22:09

## 2018-11-29 RX ADMIN — OXYCODONE HYDROCHLORIDE 5 MILLIGRAM(S): 5 TABLET ORAL at 12:36

## 2018-11-29 RX ADMIN — NEBIVOLOL HYDROCHLORIDE 2.5 MILLIGRAM(S): 5 TABLET ORAL at 06:58

## 2018-11-29 RX ADMIN — METFORMIN HYDROCHLORIDE 500 MILLIGRAM(S): 850 TABLET ORAL at 12:35

## 2018-11-29 RX ADMIN — OXYCODONE HYDROCHLORIDE 10 MILLIGRAM(S): 5 TABLET ORAL at 09:14

## 2018-11-29 RX ADMIN — Medication 1 TABLET(S): at 12:35

## 2018-11-29 RX ADMIN — POLYETHYLENE GLYCOL 3350 17 GRAM(S): 17 POWDER, FOR SOLUTION ORAL at 12:36

## 2018-11-29 RX ADMIN — CELECOXIB 200 MILLIGRAM(S): 200 CAPSULE ORAL at 06:57

## 2018-11-29 RX ADMIN — CELECOXIB 200 MILLIGRAM(S): 200 CAPSULE ORAL at 22:09

## 2018-11-29 RX ADMIN — SPIRONOLACTONE 25 MILLIGRAM(S): 25 TABLET, FILM COATED ORAL at 17:33

## 2018-11-29 RX ADMIN — SENNA PLUS 2 TABLET(S): 8.6 TABLET ORAL at 22:09

## 2018-11-29 RX ADMIN — APIXABAN 2.5 MILLIGRAM(S): 2.5 TABLET, FILM COATED ORAL at 06:57

## 2018-11-29 RX ADMIN — LIDOCAINE 2 PATCH: 4 CREAM TOPICAL at 22:06

## 2018-11-29 RX ADMIN — PANTOPRAZOLE SODIUM 40 MILLIGRAM(S): 20 TABLET, DELAYED RELEASE ORAL at 06:58

## 2018-11-29 RX ADMIN — Medication 650 MILLIGRAM(S): at 22:09

## 2018-11-29 RX ADMIN — LIDOCAINE 2 PATCH: 4 CREAM TOPICAL at 12:34

## 2018-11-29 RX ADMIN — Medication 650 MILLIGRAM(S): at 22:54

## 2018-11-29 RX ADMIN — Medication 200 MICROGRAM(S): at 06:57

## 2018-11-29 RX ADMIN — SPIRONOLACTONE 25 MILLIGRAM(S): 25 TABLET, FILM COATED ORAL at 06:58

## 2018-11-29 RX ADMIN — Medication 100 MILLIGRAM(S): at 15:08

## 2018-11-29 RX ADMIN — APIXABAN 2.5 MILLIGRAM(S): 2.5 TABLET, FILM COATED ORAL at 17:30

## 2018-11-29 RX ADMIN — CELECOXIB 200 MILLIGRAM(S): 200 CAPSULE ORAL at 22:53

## 2018-11-29 RX ADMIN — LOSARTAN POTASSIUM 50 MILLIGRAM(S): 100 TABLET, FILM COATED ORAL at 06:58

## 2018-11-29 RX ADMIN — CELECOXIB 200 MILLIGRAM(S): 200 CAPSULE ORAL at 07:06

## 2018-11-29 RX ADMIN — Medication 100 MILLIGRAM(S): at 06:58

## 2018-11-29 RX ADMIN — Medication 300 MILLIGRAM(S): at 12:32

## 2018-11-29 RX ADMIN — Medication 500 MILLIGRAM(S): at 06:57

## 2018-11-29 RX ADMIN — ZINC SULFATE TAB 220 MG (50 MG ZINC EQUIVALENT) 220 MILLIGRAM(S): 220 (50 ZN) TAB at 12:35

## 2018-11-29 RX ADMIN — Medication 500 MILLIGRAM(S): at 17:31

## 2018-11-29 NOTE — CHART NOTE - NSCHARTNOTEFT_GEN_A_CORE
[facility] Interdisciplinary Plan of Care    REHABILITATION DIAGNOSIS:  Right hip OA s/p THR        COMORBIDITIES/COMPLICATING CONDITIONS IMPACTING REHABILITATION:  HEALTH ISSUES - PROBLEM Dx: Acute blood loss anemia, morbid obesity, HTN, Hypothyroidism, Pre diabetes, gout        PAST MEDICAL & SURGICAL HISTORY:  Venous insufficiency of lower extremity  History of gout  Type 2 diabetes mellitus  BMI 50.0-59.9, adult  Essential hypertension  Hypothyroid  Primary osteoarthritis of right hip  S/P hysterectomy: total  S/P thyroidectomy: partial  S/P carpal tunnel release: left  History of arm fracture: 1993-right      Based upon consideration of the patient's impairments, functional status, complicating conditions and any other contributing factors and after information garnered from the assessments of all therapy disciplines involved in treating the patient and other pertinent clinicians:    INTERDISCIPLINARY REHABILITATION INTERVENTIONS:    [  x ] Transfer Training  [  x ] Bed Mobility  [ x  ] Therapeutic Exercise  [ x  ] Balance/Coordination Exercises  [ x  ] Locomotion retraining  [ x  ] Stairs  [ x  ] Functional Transfer Training  [x   ] Bowel/Bladder program  [ x  ] Pain Management  [x   ] Skin/Wound Care  [   ] Visual/Perceptual Training  [ x  ] Therapeutic Recreation Activities  [   ] Neuromuscular Re-education  [ x  ] Activities of Daily Living  [   ] Speech Exercise  [   ] Swallowing Exercises  [   ] Vital Stim  [x   ] Dietary Supplements  [   ] Calorie Count  [   ] Cognitive Exercises  [   ] Cognitive/Linguistic Treatment  [   ] Behavior Program  [   ] Neuropsych Therapy  [ x  ] Patient/Family Counseling  [x   ] Family Training  [   ] Community Re-entry  [   ] Orthotic Evaluation  [   ] Prosthetic Eval/Training    MEDICAL PROGNOSIS:  Good    REHAB POTENTIAL:  Good  EXPECTED DAILY THERAPY:         PT:1.5hrs       OT:1.5hrs       ST:0       P&O:0    EXPECTED INTENSITY OF PROGRAM:  3 hrs / Day    EXPECTED FREQUENCY OF PROGRAM: 5 Days/ Week    ESTIMATED LOS:  [  ] 5-7 Days  [ x ] 7-10 Days  [  ] 10- 14 Days  [  ] 14- 18 Days  [  ] 18- 21 Days    ESTIMATED DISPOSITION:  [  ] Home   [  ] Home with Outpatient Therapies  [ x ] Home with Home Therapies  [  ] Assisted Living  [  ] Nursing Home  [  ] Long Term Acute Care    INTERDISCIPLINARY FUNCTIONAL OUTCOMES/GOALS:         Gait/Mobility:6       Transfers:6       ADLs:       Functional Transfers:       Medication Management:7       Communication:na       Cognitive:na       Dysphagia:na       Bladder:7       Bowel:7     Functional Independent Measures:   7 = Independent  6 = Modified Independent  5 = Supervision  4 = Minimal Assist/ Contact Guard  3 = Moderate Assistance  2 = Maximum Assistance  1 = Total Assistance  0 = Unable to assess

## 2018-11-30 PROCEDURE — 99232 SBSQ HOSP IP/OBS MODERATE 35: CPT

## 2018-11-30 RX ORDER — APIXABAN 2.5 MG/1
2.5 TABLET, FILM COATED ORAL
Qty: 0 | Refills: 0 | Status: DISCONTINUED | OUTPATIENT
Start: 2018-11-30 | End: 2018-12-05

## 2018-11-30 RX ORDER — LANOLIN ALCOHOL/MO/W.PET/CERES
3 CREAM (GRAM) TOPICAL AT BEDTIME
Qty: 0 | Refills: 0 | Status: DISCONTINUED | OUTPATIENT
Start: 2018-11-30 | End: 2018-12-05

## 2018-11-30 RX ORDER — CELECOXIB 200 MG/1
200 CAPSULE ORAL
Qty: 0 | Refills: 0 | Status: DISCONTINUED | OUTPATIENT
Start: 2018-11-30 | End: 2018-12-05

## 2018-11-30 RX ADMIN — Medication 100 MILLIGRAM(S): at 13:37

## 2018-11-30 RX ADMIN — Medication 650 MILLIGRAM(S): at 06:15

## 2018-11-30 RX ADMIN — Medication 100 MILLIGRAM(S): at 05:22

## 2018-11-30 RX ADMIN — CELECOXIB 200 MILLIGRAM(S): 200 CAPSULE ORAL at 10:06

## 2018-11-30 RX ADMIN — SENNA PLUS 2 TABLET(S): 8.6 TABLET ORAL at 21:54

## 2018-11-30 RX ADMIN — Medication 500 MILLIGRAM(S): at 05:22

## 2018-11-30 RX ADMIN — CELECOXIB 200 MILLIGRAM(S): 200 CAPSULE ORAL at 22:50

## 2018-11-30 RX ADMIN — APIXABAN 2.5 MILLIGRAM(S): 2.5 TABLET, FILM COATED ORAL at 05:22

## 2018-11-30 RX ADMIN — Medication 650 MILLIGRAM(S): at 22:50

## 2018-11-30 RX ADMIN — APIXABAN 2.5 MILLIGRAM(S): 2.5 TABLET, FILM COATED ORAL at 18:26

## 2018-11-30 RX ADMIN — Medication 650 MILLIGRAM(S): at 05:21

## 2018-11-30 RX ADMIN — METFORMIN HYDROCHLORIDE 500 MILLIGRAM(S): 850 TABLET ORAL at 13:34

## 2018-11-30 RX ADMIN — Medication 300 MILLIGRAM(S): at 13:35

## 2018-11-30 RX ADMIN — Medication 1 TABLET(S): at 13:34

## 2018-11-30 RX ADMIN — LOSARTAN POTASSIUM 50 MILLIGRAM(S): 100 TABLET, FILM COATED ORAL at 05:22

## 2018-11-30 RX ADMIN — Medication 500 MILLIGRAM(S): at 18:26

## 2018-11-30 RX ADMIN — NEBIVOLOL HYDROCHLORIDE 2.5 MILLIGRAM(S): 5 TABLET ORAL at 05:22

## 2018-11-30 RX ADMIN — Medication 650 MILLIGRAM(S): at 21:52

## 2018-11-30 RX ADMIN — PANTOPRAZOLE SODIUM 40 MILLIGRAM(S): 20 TABLET, DELAYED RELEASE ORAL at 10:06

## 2018-11-30 RX ADMIN — Medication 100 MILLIGRAM(S): at 21:53

## 2018-11-30 RX ADMIN — ZINC SULFATE TAB 220 MG (50 MG ZINC EQUIVALENT) 220 MILLIGRAM(S): 220 (50 ZN) TAB at 13:33

## 2018-11-30 RX ADMIN — CELECOXIB 200 MILLIGRAM(S): 200 CAPSULE ORAL at 21:52

## 2018-11-30 RX ADMIN — Medication 200 MICROGRAM(S): at 05:21

## 2018-11-30 RX ADMIN — SPIRONOLACTONE 25 MILLIGRAM(S): 25 TABLET, FILM COATED ORAL at 05:22

## 2018-11-30 RX ADMIN — LIDOCAINE 2 PATCH: 4 CREAM TOPICAL at 00:13

## 2018-11-30 RX ADMIN — Medication 3 MILLIGRAM(S): at 21:52

## 2018-11-30 RX ADMIN — CELECOXIB 200 MILLIGRAM(S): 200 CAPSULE ORAL at 11:00

## 2018-11-30 NOTE — DIETITIAN INITIAL EVALUATION ADULT. - OTHER INFO
63yr Old Female - Dx: Artifical Right Hip-  Initial Assessment - Consistent Carbohydrate Diet w/ Thin Liquids (Declines DASH-TLC Diet), Denies Food Allergy, Tolerates Diet, No Chewing/Swallowing Complications (Per Pt), No Pressure Ulcers, +2 Edema to Bilat L/E, No Recent N/V/D

## 2018-11-30 NOTE — PROGRESS NOTE ADULT - SUBJECTIVE AND OBJECTIVE BOX
HISTORY OF PRESENT ILLNESS  63 year old F with PMH Obesity, HTN, gout, Hypothyroid, T2DM, primary osteoarthritis of the right hip who failed conservative measures underwent uncomplicated elective right total hip arthroplasty (anterior approach) on 11/26 with Dr. Cormier.  Post op made WBAT.  Started on Eliquis for DVT prophylaxis for 35 days (until 1/1/19).  Patient noted to have left shoulder pain and X-ray shoed calcific tendinopathy of left rotator cuff with plan for ortho follow up.  Deemed medically stable for acute rehab.          TODAY'S SUBJECTIVE & REVIEW OF SYMPTOMS  Patient seen and evaluated.  Patient endorsed some difficulty falling back to sleep after awakening.  Pain is currently 0/10 at rest and 3-4/10 during activity, controlled overall on current medications.  She provides medication schedule from prior hospital requesting timed doses.  Last bowel movement yesterday.  Denies N/V, dysuria.     [X] Constiutional WNL        [X] Cardio WNL              [X] Resp WNL  [X] GI WNL                            [X]  WNL                    [X] Heme WNL  [X] Endo WNL                       [X] Skin WNL                  [X] MSK right hip pain controlled   [X] Neuro WNL                     [X] Cognitive WNL         [X] Psych WNL      VITALS  T(C): 36.4 (11-30-18 @ 08:13)  T(F): 97.5 (11-30-18 @ 08:13), Max: 98.5 (11-29-18 @ 20:30)  HR: 66 (11-30-18 @ 08:13) (66 - 69)  BP: 119/70 (11-30-18 @ 08:13) (119/70 - 126/63)  RR:  (14 - 14)  SpO2:  (100% - 100%)  Wt(kg): --    PHYSICAL EXAM  Gen - NAD, Comfortable  	HEENT - NCAT, EOMI, MMM  	Neck - Supple, No limited ROM  	Pulm - CTAB, No wheeze, No rhonchi, No crackles  	Cardiovascular - RRR, S1S2, No murmurs  	Abdomen - Soft, NT/ND, +BS  	Extremities - Mild RLE edema, pain to deep palpation of right calf  	Neuro-  	   Cognitive - AAOx3  	   Communication - Fluent, No dysarthria  	   Attention: Intact   	   Judgement: Good evidence of judgement     	   Cranial Nerves - CN grossly 2-12 intact  	   Motor - No focal deficits  	                  LEFT    UE - ShAB 5/5, EF 5/5, EE 5/5, WE 5/5,  5/5  	                  RIGHT UE - ShAB 5/5, EF 5/5, EE 5/5, WE 5/5,  5/5  	                  LEFT    LE - HF 5/5, KE 5/5, DF 5/5, PF 5/5  	                  RIGHT LE - HF <2/5 due to pain, KE <2/5 due to pain, DF 5/5, PF 5/5       FUNCTIONAL STATUS  Gait -   Transfers -   ADL's -   Functional Transfers -    RECENT LABS/IMAGING    No labs today 11/30                        10.5   9.7   )-----------( 254      ( 29 Nov 2018 07:00 )             33.8     11-29    141  |  102  |  22  ----------------------------<  102<H>  4.1   |  31  |  0.69    Ca    9.5      29 Nov 2018 07:00    TPro  7.3  /  Alb  2.9<L>  /  TBili  0.4  /  DBili  x   /  AST  24  /  ALT  19  /  AlkPhos  60  11-29      RADIOLOGY/OTHER RESULTS  -----    MEDICATIONS   MEDICATIONS  (STANDING):  allopurinol 300 milliGRAM(s) Oral daily  apixaban 2.5 milliGRAM(s) Oral <User Schedule>  ascorbic acid 500 milliGRAM(s) Oral two times a day  celecoxib 200 milliGRAM(s) Oral <User Schedule>  docusate sodium 100 milliGRAM(s) Oral three times a day  levothyroxine 200 MICROGram(s) Oral daily  lidocaine   Patch 2 Patch Transdermal daily  losartan 50 milliGRAM(s) Oral daily  melatonin 3 milliGRAM(s) Oral at bedtime  metFORMIN Extended-Release 500 milliGRAM(s) Oral daily  multivitamin 1 Tablet(s) Oral daily  nebivolol 2.5 milliGRAM(s) Oral daily  pantoprazole    Tablet 40 milliGRAM(s) Oral before breakfast  polyethylene glycol 3350 17 Gram(s) Oral daily  senna 2 Tablet(s) Oral at bedtime  spironolactone 25 milliGRAM(s) Oral two times a day  zinc sulfate 220 milliGRAM(s) Oral daily    MEDICATIONS  (PRN):  acetaminophen   Tablet .. 650 milliGRAM(s) Oral every 6 hours PRN Mild Pain (1 - 3)  aluminum hydroxide/magnesium hydroxide/simethicone Suspension 30 milliLiter(s) Oral four times a day PRN Indigestion  bisacodyl 5 milliGRAM(s) Oral every 12 hours PRN Constipation  magnesium hydroxide Suspension 30 milliLiter(s) Oral daily PRN Constipation  oxyCODONE    IR 5 milliGRAM(s) Oral every 3 hours PRN Mild Pain (1 - 3)  oxyCODONE    IR 10 milliGRAM(s) Oral every 3 hours PRN Moderate Pain (4 - 6)      ASSESSMENT/PLAN HISTORY OF PRESENT ILLNESS  63 year old F with PMH Obesity, HTN, gout, Hypothyroid, T2DM, primary osteoarthritis of the right hip who failed conservative measures underwent uncomplicated elective right total hip arthroplasty (anterior approach) on 11/26 with Dr. Cormier.  Post op made WBAT.  Started on Eliquis for DVT prophylaxis for 35 days (until 1/1/19).  Patient noted to have left shoulder pain and X-ray shoed calcific tendinopathy of left rotator cuff with plan for ortho follow up.  Deemed medically stable for acute rehab.          TODAY'S SUBJECTIVE & REVIEW OF SYMPTOMS  Patient seen and evaluated.  Patient endorsed some difficulty falling back to sleep after awakening.  Pain is currently 0/10 at rest and 3-4/10 during activity, controlled overall on current medications.  She provides medication schedule from prior hospital requesting timed doses.  Last bowel movement yesterday.  Denies N/V, dysuria.   Met with patient and brothyer at bedside. Brother has worked with therapists for ambulation with RW, cleared for ambulation in with assistive device and supervision      [X] Constiutional WNL        [X] Cardio WNL              [X] Resp WNL  [X] GI WNL                            [X]  WNL                    [X] Heme WNL  [X] Endo WNL                       [X] Skin WNL                  [X] MSK right hip pain controlled   [X] Neuro WNL                     [X] Cognitive WNL         [X] Psych WNL      VITALS  T(C): 36.4 (11-30-18 @ 08:13)  T(F): 97.5 (11-30-18 @ 08:13), Max: 98.5 (11-29-18 @ 20:30)  HR: 66 (11-30-18 @ 08:13) (66 - 69)  BP: 119/70 (11-30-18 @ 08:13) (119/70 - 126/63)  RR:  (14 - 14)  SpO2:  (100% - 100%)  Wt(kg): --    PHYSICAL EXAM  Gen - NAD, Comfortable O x 4  	HEENT - NCAT, EOMI, MMM  	Pulm - CTAB, No wheeze, No rhonchi, No crackles  	Cardiovascular - RRR, S1S2, No murmurs  	Abdomen - Soft, NT/ND, +BS  	Extremities - Mild RLE edema, pain to deep palpation of right calf  anterior incision: healing well, intact, no induration +surrounding soft tissue swelling, mildly tender  no erythema or warmth, no oozing  	Neuro-  	   Cognitive - AAOx3  	   Communication - Fluent, No dysarthria  	   Motor - No focal deficits  	                  LEFT    UE - ShAB 5/5, EF 5/5, EE 5/5, WE 5/5,  5/5  	                  RIGHT UE - ShAB 5/5, EF 5/5, EE 5/5, WE 5/5,  5/5  	                  LEFT    LE - HF 5/5, KE 5/5, DF 5/5, PF 5/5  	                  RIGHT LE - HF <2/5 due to pain, KE <2/5 due to pain, DF 5/5, PF 5/5       RECENT LABS/IMAGING    No labs today 11/30                        10.5   9.7   )-----------( 254      ( 29 Nov 2018 07:00 )             33.8     11-29    141  |  102  |  22  ----------------------------<  102<H>  4.1   |  31  |  0.69    Ca    9.5      29 Nov 2018 07:00    TPro  7.3  /  Alb  2.9<L>  /  TBili  0.4  /  DBili  x   /  AST  24  /  ALT  19  /  AlkPhos  60  11-29      RADIOLOGY/OTHER RESULTS  -----    MEDICATIONS   MEDICATIONS  (STANDING):  allopurinol 300 milliGRAM(s) Oral daily  apixaban 2.5 milliGRAM(s) Oral <User Schedule>  ascorbic acid 500 milliGRAM(s) Oral two times a day  celecoxib 200 milliGRAM(s) Oral <User Schedule>  docusate sodium 100 milliGRAM(s) Oral three times a day  levothyroxine 200 MICROGram(s) Oral daily  lidocaine   Patch 2 Patch Transdermal daily  losartan 50 milliGRAM(s) Oral daily  melatonin 3 milliGRAM(s) Oral at bedtime  metFORMIN Extended-Release 500 milliGRAM(s) Oral daily  multivitamin 1 Tablet(s) Oral daily  nebivolol 2.5 milliGRAM(s) Oral daily  pantoprazole    Tablet 40 milliGRAM(s) Oral before breakfast  polyethylene glycol 3350 17 Gram(s) Oral daily  senna 2 Tablet(s) Oral at bedtime  spironolactone 25 milliGRAM(s) Oral two times a day  zinc sulfate 220 milliGRAM(s) Oral daily    MEDICATIONS  (PRN):  acetaminophen   Tablet .. 650 milliGRAM(s) Oral every 6 hours PRN Mild Pain (1 - 3)  aluminum hydroxide/magnesium hydroxide/simethicone Suspension 30 milliLiter(s) Oral four times a day PRN Indigestion  bisacodyl 5 milliGRAM(s) Oral every 12 hours PRN Constipation  magnesium hydroxide Suspension 30 milliLiter(s) Oral daily PRN Constipation  oxyCODONE    IR 5 milliGRAM(s) Oral every 3 hours PRN Mild Pain (1 - 3)  oxyCODONE    IR 10 milliGRAM(s) Oral every 3 hours PRN Moderate Pain (4 - 6)      ASSESSMENT/PLAN

## 2018-11-30 NOTE — PROGRESS NOTE ADULT - ASSESSMENT
63 year old F with PMH Morbid Obesity, HTN, gout, Hypothyroid, T2DM, primary osteoarthritis of the right hip s/p elective R THR on 11/26 now with functional, ADL, gait impairment.      #s/p right THR   - continue Comprehensive Rehab Program of PT/OT   - Anterior Total Hip Precautions.  No SLR.  No hip ER when extended.  No hyperextension of hip when standing  - Celebrex for HO PPX x 21 days scheduled for 10 AM, 10 PM   - Remove sutures and Prineo 2 weeks post op (12/10/18)   - Weight bearing status: WBAT   - Eliquis (scheduled 6 AM, 6 PM) for DVT PPX until 1/1/19     #Pain Control  - Tylenol PRN, Oxycodone PRN, Lidoderm patch     #HTN    -  Losartan, Bystolic, Spironolactone     #Gout  - allopurinol    #Pre-DM (HBA1C 5.8)  - metformin  - CC diet     #Morbid obesity  -Monitor effect on rehab  -Metformin    #Hypothyroid  - levothyroxine    #Sleep  - Melatonin 3 mg qHS     Precautions / PROPHYLAXIS:   - Falls, Total Hip   - Lungs: Aspiration, Incentive Spirometer [patient instructed at the bedside]   - Pressure injury/Skin: Turn Q2hrs while in bed   - DVT: Eliquis      [facility]  [facility] 63 [facility] with PMH Morbid Obesity, HTN, gout, Hypothyroid, T2DM, primary osteoarthritis of the right hip s/p elective R THR on 11/26 now with functional, ADL, gait impairment.      #s/p right THR   - continue Comprehensive Rehab Program of PT/OT   - Anterior Total Hip Precautions.  No SLR.  No hip ER when extended.  No hyperextension of hip when standing. This was reviewed in detail with the patient and brother  - Celebrex for HO PPX x 21 days scheduled for 10 AM, 10 PM   - Remove sutures and Prineo 2 weeks post op (12/10/18)   - Weight bearing status: WBAT   - Eliquis (scheduled 6 AM, 6 PM) for DVT PPX until 1/1/19 . Discussed with patient and brother    #Pain Control  - Tylenol PRN, Oxycodone PRN, Lidoderm patch     #HTN    -  Losartan, Bystolic, Spironolactone     #Gout  - allopurinol    #Pre-DM (HBA1C 5.8)  - metformin  - CC diet     #Morbid obesity  -Monitor effect on rehab  -Metformin    #Hypothyroid  - levothyroxine    #Sleep  - Melatonin 3 mg qHS     Precautions / PROPHYLAXIS:   - Falls, Total ANTERIOR HIP precautions  - Lungs: Aspiration, Incentive Spirometer [patient instructed at the bedside]   - Pressure injury/Skin: Turn Q2hrs while in bed   - DVT: Eliquis      [facility]  [facility]

## 2018-12-01 PROCEDURE — 99232 SBSQ HOSP IP/OBS MODERATE 35: CPT

## 2018-12-01 RX ADMIN — Medication 500 MILLIGRAM(S): at 05:43

## 2018-12-01 RX ADMIN — Medication 100 MILLIGRAM(S): at 05:43

## 2018-12-01 RX ADMIN — Medication 1 TABLET(S): at 12:09

## 2018-12-01 RX ADMIN — CELECOXIB 200 MILLIGRAM(S): 200 CAPSULE ORAL at 22:45

## 2018-12-01 RX ADMIN — SPIRONOLACTONE 25 MILLIGRAM(S): 25 TABLET, FILM COATED ORAL at 17:30

## 2018-12-01 RX ADMIN — CELECOXIB 200 MILLIGRAM(S): 200 CAPSULE ORAL at 10:10

## 2018-12-01 RX ADMIN — Medication 200 MICROGRAM(S): at 05:42

## 2018-12-01 RX ADMIN — LOSARTAN POTASSIUM 50 MILLIGRAM(S): 100 TABLET, FILM COATED ORAL at 05:41

## 2018-12-01 RX ADMIN — NEBIVOLOL HYDROCHLORIDE 2.5 MILLIGRAM(S): 5 TABLET ORAL at 05:43

## 2018-12-01 RX ADMIN — APIXABAN 2.5 MILLIGRAM(S): 2.5 TABLET, FILM COATED ORAL at 17:30

## 2018-12-01 RX ADMIN — Medication 650 MILLIGRAM(S): at 05:42

## 2018-12-01 RX ADMIN — PANTOPRAZOLE SODIUM 40 MILLIGRAM(S): 20 TABLET, DELAYED RELEASE ORAL at 05:45

## 2018-12-01 RX ADMIN — APIXABAN 2.5 MILLIGRAM(S): 2.5 TABLET, FILM COATED ORAL at 06:00

## 2018-12-01 RX ADMIN — CELECOXIB 200 MILLIGRAM(S): 200 CAPSULE ORAL at 10:30

## 2018-12-01 RX ADMIN — Medication 500 MILLIGRAM(S): at 17:30

## 2018-12-01 RX ADMIN — METFORMIN HYDROCHLORIDE 500 MILLIGRAM(S): 850 TABLET ORAL at 12:09

## 2018-12-01 RX ADMIN — Medication 100 MILLIGRAM(S): at 12:09

## 2018-12-01 RX ADMIN — LIDOCAINE 2 PATCH: 4 CREAM TOPICAL at 20:41

## 2018-12-01 RX ADMIN — LIDOCAINE 2 PATCH: 4 CREAM TOPICAL at 12:10

## 2018-12-01 RX ADMIN — CELECOXIB 200 MILLIGRAM(S): 200 CAPSULE ORAL at 21:42

## 2018-12-01 RX ADMIN — Medication 3 MILLIGRAM(S): at 21:43

## 2018-12-01 RX ADMIN — Medication 300 MILLIGRAM(S): at 12:09

## 2018-12-01 RX ADMIN — ZINC SULFATE TAB 220 MG (50 MG ZINC EQUIVALENT) 220 MILLIGRAM(S): 220 (50 ZN) TAB at 12:09

## 2018-12-01 RX ADMIN — SPIRONOLACTONE 25 MILLIGRAM(S): 25 TABLET, FILM COATED ORAL at 05:43

## 2018-12-01 RX ADMIN — Medication 650 MILLIGRAM(S): at 06:19

## 2018-12-01 NOTE — PROGRESS NOTE ADULT - SUBJECTIVE AND OBJECTIVE BOX
Patient is a 63y old  Female who presents with a chief complaint of s/p Right Total Hip replacement (2018 11:39)      HPI:  63 year old F with PMH Obesity, HTN, gout, Hypothyroid, T2DM, primary osteoarthritis of the right hip who failed conservative measures underwent uncomplicated elective right total hip arthroplasty (anterior approach) on  with Dr. Cormier.  Post op made WBAT.  Started on Eliquis for DVT prophylaxis for 35 days (until 19).  Patient noted to have left shoulder pain and X-ray shoed calcific tendinopathy of left rotator cuff with plan for ortho follow up.  Deemed medically stable for acute rehab. (2018 16:48)      PAST MEDICAL & SURGICAL HISTORY:  Venous insufficiency of lower extremity  History of gout  Type 2 diabetes mellitus  BMI 50.0-59.9, adult  Essential hypertension  Hypothyroid  Primary osteoarthritis of right hip  S/P hysterectomy: total  S/P thyroidectomy: partial  S/P carpal tunnel release: left  History of arm fracture: -right      MEDICATIONS  (STANDING):  allopurinol 300 milliGRAM(s) Oral daily  apixaban 2.5 milliGRAM(s) Oral <User Schedule>  ascorbic acid 500 milliGRAM(s) Oral two times a day  celecoxib 200 milliGRAM(s) Oral <User Schedule>  docusate sodium 100 milliGRAM(s) Oral three times a day  levothyroxine 200 MICROGram(s) Oral daily  lidocaine   Patch 2 Patch Transdermal daily  losartan 50 milliGRAM(s) Oral daily  melatonin 3 milliGRAM(s) Oral at bedtime  metFORMIN Extended-Release 500 milliGRAM(s) Oral daily  multivitamin 1 Tablet(s) Oral daily  nebivolol 2.5 milliGRAM(s) Oral daily  pantoprazole    Tablet 40 milliGRAM(s) Oral before breakfast  polyethylene glycol 3350 17 Gram(s) Oral daily  senna 2 Tablet(s) Oral at bedtime  spironolactone 25 milliGRAM(s) Oral two times a day  zinc sulfate 220 milliGRAM(s) Oral daily    MEDICATIONS  (PRN):  acetaminophen   Tablet .. 650 milliGRAM(s) Oral every 6 hours PRN Mild Pain (1 - 3)  aluminum hydroxide/magnesium hydroxide/simethicone Suspension 30 milliLiter(s) Oral four times a day PRN Indigestion  bisacodyl 5 milliGRAM(s) Oral every 12 hours PRN Constipation  magnesium hydroxide Suspension 30 milliLiter(s) Oral daily PRN Constipation  oxyCODONE    IR 5 milliGRAM(s) Oral every 3 hours PRN Mild Pain (1 - 3)  oxyCODONE    IR 10 milliGRAM(s) Oral every 3 hours PRN Moderate Pain (4 - 6)      Allergies    No Known Allergies    Intolerances        TODAY'S SUBJECTIVE & REVIEW OF SYMPTOMS:    Patient stable, pain controlledonc urrent medications, denies B/B complaints. No CP or SOB ,no palpitations. Feels frustrated because SCD felt to be too loose    PHYSICAL EXAM  63y  Vital Signs Last 24 Hrs  T(C): 36.4 (01 Dec 2018 08:28), Max: 36.7 (2018 20:22)  T(F): 97.5 (01 Dec 2018 08:28), Max: 98.1 (2018 20:22)  HR: 59 (01 Dec 2018 08:28) (59 - 76)  BP: 127/84 (01 Dec 2018 08:28) (102/66 - 127/84)  BP(mean): --  RR: 14 (01 Dec 2018 08:28) (14 - 14)  SpO2: 97% (01 Dec 2018 08:28) (97% - 97%)  Daily     Daily Weight in k.9 (2018 13:12)    General: alert O x 3  Resp: cler bilaterally no R/R/W no distress  Cardio: RRR NL S1 and S2 regular HR 59  Abdomen: soft, obese, NT +BS  Extremities: right calf +mildly warm, no erythema, mildy tense. Per patient, slightly increased swelling, no pitting  left Le calf soft, NT  +right anterior hip incision intact, clean, dry        RECENT LABS:                      CAPILLARY BLOOD GLUCOSE        IMPRESSION AND PLAN:

## 2018-12-01 NOTE — PROGRESS NOTE ADULT - ASSESSMENT
63 year old F with PMH Morbid Obesity, HTN, gout, Hypothyroid, T2DM, primary osteoarthritis of the right hip s/p elective R THR on 11/26 now with functional, ADL, gait impairment.      #s/p right THR ANTERIOR hip precautions. WBAT RLE  - continue Comprehensive Rehab Program of PT/OT   - Anterior Total Hip Precautions.  No SLR.  No hip ER when extended.  No hyperextension of hip when standing. This was reviewed in detail with the patient and brother  - Celebrex for HO PPX x 21 days scheduled for 10 AM, 10 PM   - Remove sutures and Prineo 2 weeks post op (12/10/18) Discussed date with pateint  - Eliquis (scheduled 6 AM, 6 PM) for DVT PPX until 1/1/19    #Pain Control  - Tylenol PRN, Oxycodone PRN, Lidoderm patch     #HTN    -  Losartan, Bystolic, Spironolactone     #Gout  - allopurinol    #Pre-DM (HBA1C 5.8)  - metformin  - CC diet     #Morbid obesity  -Monitor effect on rehab  -Metformin    #Hypothyroid  - levothyroxine    #Sleep  - Melatonin 3 mg qHS     - DVT: Eliquis    DOPPLER RLE r/o DVT due to swelling and warmth    D[facility]:  [facility]      LABS:  doppler LE

## 2018-12-02 PROCEDURE — 99232 SBSQ HOSP IP/OBS MODERATE 35: CPT

## 2018-12-02 PROCEDURE — 93971 EXTREMITY STUDY: CPT | Mod: 26,RT

## 2018-12-02 RX ADMIN — APIXABAN 2.5 MILLIGRAM(S): 2.5 TABLET, FILM COATED ORAL at 05:47

## 2018-12-02 RX ADMIN — Medication 3 MILLIGRAM(S): at 21:20

## 2018-12-02 RX ADMIN — Medication 650 MILLIGRAM(S): at 20:38

## 2018-12-02 RX ADMIN — ZINC SULFATE TAB 220 MG (50 MG ZINC EQUIVALENT) 220 MILLIGRAM(S): 220 (50 ZN) TAB at 11:55

## 2018-12-02 RX ADMIN — APIXABAN 2.5 MILLIGRAM(S): 2.5 TABLET, FILM COATED ORAL at 17:29

## 2018-12-02 RX ADMIN — SPIRONOLACTONE 25 MILLIGRAM(S): 25 TABLET, FILM COATED ORAL at 17:29

## 2018-12-02 RX ADMIN — Medication 500 MILLIGRAM(S): at 17:29

## 2018-12-02 RX ADMIN — CELECOXIB 200 MILLIGRAM(S): 200 CAPSULE ORAL at 22:48

## 2018-12-02 RX ADMIN — Medication 200 MICROGRAM(S): at 05:48

## 2018-12-02 RX ADMIN — Medication 650 MILLIGRAM(S): at 21:30

## 2018-12-02 RX ADMIN — CELECOXIB 200 MILLIGRAM(S): 200 CAPSULE ORAL at 11:53

## 2018-12-02 RX ADMIN — Medication 500 MILLIGRAM(S): at 05:47

## 2018-12-02 RX ADMIN — CELECOXIB 200 MILLIGRAM(S): 200 CAPSULE ORAL at 21:20

## 2018-12-02 RX ADMIN — CELECOXIB 200 MILLIGRAM(S): 200 CAPSULE ORAL at 10:35

## 2018-12-02 RX ADMIN — METFORMIN HYDROCHLORIDE 500 MILLIGRAM(S): 850 TABLET ORAL at 11:55

## 2018-12-02 RX ADMIN — Medication 300 MILLIGRAM(S): at 11:55

## 2018-12-02 RX ADMIN — Medication 1 TABLET(S): at 11:55

## 2018-12-02 RX ADMIN — PANTOPRAZOLE SODIUM 40 MILLIGRAM(S): 20 TABLET, DELAYED RELEASE ORAL at 05:47

## 2018-12-02 NOTE — PROGRESS NOTE ADULT - SUBJECTIVE AND OBJECTIVE BOX
Patient is a 63y old  Female who presents with a chief complaint of s/p Right Total Hip replacement (02 Dec 2018 09:39)      HPI:  63 year old F with PMH Obesity, HTN, gout, Hypothyroid, T2DM, primary osteoarthritis of the right hip who failed conservative measures underwent uncomplicated elective right total hip arthroplasty (anterior approach) on 11/26 with Dr. Cormier.  Post op made WBAT.  Started on Eliquis for DVT prophylaxis for 35 days (until 1/1/19).  Patient noted to have left shoulder pain and X-ray shoed calcific tendinopathy of left rotator cuff with plan for ortho follow up.  Deemed medically stable for acute rehab. (28 Nov 2018 16:48)      PAST MEDICAL & SURGICAL HISTORY:  Venous insufficiency of lower extremity  History of gout  Type 2 diabetes mellitus  BMI 50.0-59.9, adult  Essential hypertension  Hypothyroid  Primary osteoarthritis of right hip  S/P hysterectomy: total  S/P thyroidectomy: partial  S/P carpal tunnel release: left  History of arm fracture: 1993-right      MEDICATIONS  (STANDING):  allopurinol 300 milliGRAM(s) Oral daily  apixaban 2.5 milliGRAM(s) Oral <User Schedule>  ascorbic acid 500 milliGRAM(s) Oral two times a day  celecoxib 200 milliGRAM(s) Oral <User Schedule>  docusate sodium 100 milliGRAM(s) Oral three times a day  levothyroxine 200 MICROGram(s) Oral daily  lidocaine   Patch 2 Patch Transdermal daily  losartan 50 milliGRAM(s) Oral daily  melatonin 3 milliGRAM(s) Oral at bedtime  metFORMIN Extended-Release 500 milliGRAM(s) Oral daily  multivitamin 1 Tablet(s) Oral daily  nebivolol 2.5 milliGRAM(s) Oral daily  pantoprazole    Tablet 40 milliGRAM(s) Oral before breakfast  polyethylene glycol 3350 17 Gram(s) Oral daily  senna 2 Tablet(s) Oral at bedtime  spironolactone 25 milliGRAM(s) Oral two times a day  zinc sulfate 220 milliGRAM(s) Oral daily    MEDICATIONS  (PRN):  acetaminophen   Tablet .. 650 milliGRAM(s) Oral every 6 hours PRN Mild Pain (1 - 3)  aluminum hydroxide/magnesium hydroxide/simethicone Suspension 30 milliLiter(s) Oral four times a day PRN Indigestion  bisacodyl 5 milliGRAM(s) Oral every 12 hours PRN Constipation  magnesium hydroxide Suspension 30 milliLiter(s) Oral daily PRN Constipation  oxyCODONE    IR 5 milliGRAM(s) Oral every 3 hours PRN Mild Pain (1 - 3)  oxyCODONE    IR 10 milliGRAM(s) Oral every 3 hours PRN Moderate Pain (4 - 6)      Allergies    No Known Allergies    Intolerances        TODAY'S SUBJECTIVE & REVIEW OF SYMPTOMS:    Patient stable, no CP, no palpiatations. Feels some discomfort in posterior knee. No SOB    PHYSICAL EXAM  63y  Vital Signs Last 24 Hrs  T(C): 36.7 (02 Dec 2018 09:19), Max: 36.7 (02 Dec 2018 09:19)  T(F): 98.1 (02 Dec 2018 09:19), Max: 98.1 (02 Dec 2018 09:19)  HR: 75 (02 Dec 2018 09:19) (72 - 88)  BP: 128/78 (02 Dec 2018 09:19) (102/68 - 128/78)  BP(mean): --  RR: 14 (02 Dec 2018 09:19) (14 - 14)  SpO2: 97% (02 Dec 2018 09:19) (97% - 100%)  Daily     Daily     General: alert, comfortable, not anxious, in bed    Resp: claer bilaterally no R/R/W  Cardio: HR 75  Abdomen:  +soft, NT ND  Extremities: riht groin incision dry, healing well, no surrouding erythema or fluctuance  RLE 1+ edema, no erythema or warmth  no fullness posterior knee  LLE trace-1+ swelling more distensible        RECENT LABS:          EXAM:  US DPLX LWR EXT VEINS LTD RT      PROCEDURE DATE:  12/02/2018        INTERPRETATION:  CLINICAL INFORMATION: Right leg swelling    COMPARISON: None available.    TECHNIQUE: Duplex sonography of the RIGHT LOWER extremity with color and   spectral Doppler, with and without compression.      FINDINGS:    There is normal compressibility of the right common femoral, femoral and   popliteal veins. No calf vein thrombosis is detected.    The contralateral common femoral vein is patent.    Doppler examination shows normal spontaneous and phasic flow.    IMPRESSION:     No evidence of right lower extremity deep venous thrombosis.              CAPILLARY BLOOD GLUCOSE        IMPRESSION AND PLAN:

## 2018-12-02 NOTE — PROGRESS NOTE ADULT - ASSESSMENT
63 year old F with PMH Morbid Obesity, HTN, gout, Hypothyroid, T2DM, primary osteoarthritis of the right hip s/p elective R THR on 11/26 now with functional, ADL, gait impairment.      #s/p right THR ANTERIOR hip precautions. WBAT RLE  - continue Comprehensive Rehab Program of PT/OT   - Anterior Total Hip Precautions.  No SLR.  No hip ER when extended.  No hyperextension of hip when standing. This was reviewed in detail with the patient and brother  - Celebrex for HO PPX x 21 days scheduled for 10 AM, 10 PM   - Remove sutures and Prineo 2 weeks post op (12/10/18) Discussed date with pateint  - Eliquis (scheduled 6 AM, 6 PM) for DVT PPX until 1/1/19    #Pain Control  - Tylenol PRN, Oxycodone PRN, Lidoderm patch     #HTN    -  Losartan, Bystolic, Spironolactone     #Gout  - allopurinol    #Pre-DM (HBA1C 5.8)  - metformin  - CC diet     #Morbid obesity  -Monitor effect on rehab  -Metformin    #Hypothyroid  - levothyroxine    #Sleep  - Melatonin 3 mg qHS     - DVT:  -continue Eliquis    DOPPLER RLE r/o DVT due to swelling and warmth: NEGATIVE 12/2/18    Diet:  Regular + Thins [[facility]

## 2018-12-02 NOTE — PROGRESS NOTE ADULT - SUBJECTIVE AND OBJECTIVE BOX
Patient is a 63y old  Female who presents with a chief complaint of s/p Right Total Hip replacement (01 Dec 2018 10:27)      Patient seen and examined at bedside, no events overnight, in no acute distress.     ALLERGIES:  No Known Allergies    MEDICATIONS:  acetaminophen   Tablet .. 650 milliGRAM(s) Oral every 6 hours PRN  apixaban 2.5 milliGRAM(s) Oral <User Schedule>  bisacodyl 5 milliGRAM(s) Oral every 12 hours PRN  celecoxib 200 milliGRAM(s) Oral <User Schedule>  lidocaine   Patch 2 Patch Transdermal daily  melatonin 3 milliGRAM(s) Oral at bedtime  nebivolol 2.5 milliGRAM(s) Oral daily  polyethylene glycol 3350 17 Gram(s) Oral daily    Vital Signs Last 24 Hrs  T(C): 36.7 (02 Dec 2018 09:19), Max: 36.7 (02 Dec 2018 09:19)  T(F): 98.1 (02 Dec 2018 09:19), Max: 98.1 (02 Dec 2018 09:19)  HR: 75 (02 Dec 2018 09:19) (72 - 88)  BP: 128/78 (02 Dec 2018 09:19) (102/68 - 128/78)  BP(mean): --  RR: 14 (02 Dec 2018 09:19) (14 - 14)  SpO2: 97% (02 Dec 2018 09:19) (97% - 100%)  I&O's Summary        PAST MEDICAL & SURGICAL HISTORY:  Venous insufficiency of lower extremity  History of gout  Type 2 diabetes mellitus  BMI 50.0-59.9, adult  Essential hypertension  Hypothyroid  Primary osteoarthritis of right hip  S/P hysterectomy: total  S/P thyroidectomy: partial  S/P carpal tunnel release: left  History of arm fracture: 1993-right      Home Medications:  acetaminophen 500 mg oral tablet: 2 tab(s) orally every 8 hours (27 Nov 2018 11:27)  allopurinol 300 mg oral tablet: 1 tab(s) orally once a day (26 Nov 2018 07:19)  bumetanide 1 mg oral tablet: 1 tab(s) orally once a day (26 Nov 2018 07:19)  Bystolic 2.5 mg oral tablet: 1 tab(s) orally once a day (26 Nov 2018 07:19)  docusate sodium 100 mg oral capsule: 1 cap(s) orally 3 times a day (27 Nov 2018 15:10)  levothyroxine 200 mcg (0.2 mg) oral tablet: 1 tab(s) orally once a day (26 Nov 2018 07:19)  metFORMIN 500 mg oral tablet, extended release: 1 tab(s) orally once a day (26 Nov 2018 07:19)  olmesartan 20 mg oral tablet: 1 tab(s) orally once a day (26 Nov 2018 07:19)  polyethylene glycol 3350 oral powder for reconstitution: 17 gram(s) orally once a day, As needed, Constipation (27 Nov 2018 15:10)  senna oral tablet: 2 tab(s) orally once a day (at bedtime) (27 Nov 2018 15:10)  spironolactone 25 mg oral tablet: 1 tab(s) orally 2 times a day (06 Nov 2018 11:36)      PHYSICAL EXAM:  General: NAD, A/O x3  ENT: MMM  Neck: Supple, No JVD  Lungs: Clear to percussion bilaterally  Cardio: RRR, S1/S2, No murmurs  Abdomen: Soft, Nontender, Nondistended; Bowel sounds present  Extremities: B/L LE +2 edema    LABS: no new labs    CAPILLARY BLOOD GLUCOSE    11-06 [id number] 5.8    RADIOLOGY & ADDITIONAL TESTS: < from: US Duplex Venous Lower Ext Ltd, Right (12.02.18 @ 08:55) >  IMPRESSION:     No evidence of right lower extremity deep venous thrombosis.    < end of copied text >      Care Discussed with Consultants/Other Providers: PM&R team

## 2018-12-02 NOTE — PROGRESS NOTE ADULT - ASSESSMENT
64 y/o F with PMH of primary OA s/p right CHERYL, hypothyroidism, T2DM, morbid obesity (BMI 50),now for acute rehab s/p right CHERYL.    1. Primary OA s/p right CHERYL  Cont. PT/OT as tolerated,   Pain management with bowel regimen      2. T2DM  Cont. metformin  Monitor FS    3. Morbid Obesity- BMI 50  Weight loss counseling, nutrition following     4. HTN  Controlled on current regimen     5. Gout  No flare-ups, continue allopurinol     6. DVT PPX - Cont. apixaban until 1/1/19 62 y/o F with PMH of primary OA s/p right CHERYL, hypothyroidism, T2DM, morbid obesity (BMI 50),now for acute rehab s/p right CHERYL.    1. Primary OA s/p right CHERYL  Cont. PT/OT as tolerated,   Pain management with bowel regimen      2. T2DM  Cont. metformin  Monitor FS    3. Morbid Obesity- BMI 50  Weight loss counseling, nutrition following     4. HTN  Controlled on current regimen     5. Gout  No flare-ups, continue allopurinol     6. Right leg swelling (no pain)  Right LE UE - no DVT    7. DVT PPX - Cont. apixaban until 1/1/19

## 2018-12-03 ENCOUNTER — TRANSCRIPTION ENCOUNTER (OUTPATIENT)
Age: 63
End: 2018-12-03

## 2018-12-03 PROCEDURE — 99232 SBSQ HOSP IP/OBS MODERATE 35: CPT | Mod: GC

## 2018-12-03 RX ORDER — CELECOXIB 200 MG/1
1 CAPSULE ORAL
Qty: 0 | Refills: 0 | COMMUNITY
Start: 2018-12-03

## 2018-12-03 RX ORDER — OLMESARTAN MEDOXOMIL 5 MG/1
1 TABLET, FILM COATED ORAL
Qty: 30 | Refills: 0 | OUTPATIENT
Start: 2018-12-03

## 2018-12-03 RX ORDER — ALLOPURINOL 300 MG
1 TABLET ORAL
Qty: 0 | Refills: 0 | COMMUNITY

## 2018-12-03 RX ORDER — SPIRONOLACTONE 25 MG/1
1 TABLET, FILM COATED ORAL
Qty: 30 | Refills: 0 | OUTPATIENT
Start: 2018-12-03

## 2018-12-03 RX ORDER — METFORMIN HYDROCHLORIDE 850 MG/1
1 TABLET ORAL
Qty: 0 | Refills: 0 | COMMUNITY

## 2018-12-03 RX ORDER — APIXABAN 2.5 MG/1
1 TABLET, FILM COATED ORAL
Qty: 54 | Refills: 0 | OUTPATIENT
Start: 2018-12-03 | End: 2018-12-29

## 2018-12-03 RX ORDER — CELECOXIB 200 MG/1
1 CAPSULE ORAL
Qty: 32 | Refills: 0 | OUTPATIENT
Start: 2018-12-03 | End: 2018-12-18

## 2018-12-03 RX ORDER — BUMETANIDE 0.25 MG/ML
1 INJECTION INTRAMUSCULAR; INTRAVENOUS DAILY
Qty: 0 | Refills: 0 | Status: DISCONTINUED | OUTPATIENT
Start: 2018-12-03 | End: 2018-12-05

## 2018-12-03 RX ORDER — BUMETANIDE 0.25 MG/ML
1 INJECTION INTRAMUSCULAR; INTRAVENOUS
Qty: 0 | Refills: 0 | COMMUNITY

## 2018-12-03 RX ORDER — ZINC SULFATE TAB 220 MG (50 MG ZINC EQUIVALENT) 220 (50 ZN) MG
1 TAB ORAL
Qty: 30 | Refills: 0 | OUTPATIENT
Start: 2018-12-03

## 2018-12-03 RX ORDER — BUMETANIDE 0.25 MG/ML
1 INJECTION INTRAMUSCULAR; INTRAVENOUS
Qty: 30 | Refills: 0 | OUTPATIENT
Start: 2018-12-03

## 2018-12-03 RX ORDER — LEVOTHYROXINE SODIUM 125 MCG
1 TABLET ORAL
Qty: 30 | Refills: 0 | OUTPATIENT
Start: 2018-12-03

## 2018-12-03 RX ORDER — OLMESARTAN MEDOXOMIL 5 MG/1
1 TABLET, FILM COATED ORAL
Qty: 0 | Refills: 0 | COMMUNITY

## 2018-12-03 RX ORDER — NEBIVOLOL HYDROCHLORIDE 5 MG/1
1 TABLET ORAL
Qty: 30 | Refills: 0 | OUTPATIENT
Start: 2018-12-03

## 2018-12-03 RX ORDER — SPIRONOLACTONE 25 MG/1
1 TABLET, FILM COATED ORAL
Qty: 0 | Refills: 0 | COMMUNITY

## 2018-12-03 RX ORDER — NEBIVOLOL HYDROCHLORIDE 5 MG/1
1 TABLET ORAL
Qty: 0 | Refills: 0 | COMMUNITY

## 2018-12-03 RX ORDER — ALLOPURINOL 300 MG
1 TABLET ORAL
Qty: 30 | Refills: 0 | OUTPATIENT
Start: 2018-12-03

## 2018-12-03 RX ORDER — METFORMIN HYDROCHLORIDE 850 MG/1
1 TABLET ORAL
Qty: 30 | Refills: 0 | OUTPATIENT
Start: 2018-12-03

## 2018-12-03 RX ORDER — LEVOTHYROXINE SODIUM 125 MCG
1 TABLET ORAL
Qty: 0 | Refills: 0 | COMMUNITY

## 2018-12-03 RX ORDER — SENNA PLUS 8.6 MG/1
2 TABLET ORAL
Qty: 30 | Refills: 0 | OUTPATIENT
Start: 2018-12-03

## 2018-12-03 RX ADMIN — Medication 650 MILLIGRAM(S): at 22:10

## 2018-12-03 RX ADMIN — BUMETANIDE 1 MILLIGRAM(S): 0.25 INJECTION INTRAMUSCULAR; INTRAVENOUS at 12:31

## 2018-12-03 RX ADMIN — CELECOXIB 200 MILLIGRAM(S): 200 CAPSULE ORAL at 22:10

## 2018-12-03 RX ADMIN — Medication 500 MILLIGRAM(S): at 17:09

## 2018-12-03 RX ADMIN — CELECOXIB 200 MILLIGRAM(S): 200 CAPSULE ORAL at 10:28

## 2018-12-03 RX ADMIN — LIDOCAINE 2 PATCH: 4 CREAM TOPICAL at 20:05

## 2018-12-03 RX ADMIN — NEBIVOLOL HYDROCHLORIDE 2.5 MILLIGRAM(S): 5 TABLET ORAL at 05:38

## 2018-12-03 RX ADMIN — CELECOXIB 200 MILLIGRAM(S): 200 CAPSULE ORAL at 11:00

## 2018-12-03 RX ADMIN — CELECOXIB 200 MILLIGRAM(S): 200 CAPSULE ORAL at 21:13

## 2018-12-03 RX ADMIN — Medication 650 MILLIGRAM(S): at 21:13

## 2018-12-03 RX ADMIN — APIXABAN 2.5 MILLIGRAM(S): 2.5 TABLET, FILM COATED ORAL at 05:38

## 2018-12-03 RX ADMIN — Medication 650 MILLIGRAM(S): at 10:18

## 2018-12-03 RX ADMIN — Medication 500 MILLIGRAM(S): at 05:38

## 2018-12-03 RX ADMIN — LIDOCAINE 2 PATCH: 4 CREAM TOPICAL at 12:27

## 2018-12-03 RX ADMIN — PANTOPRAZOLE SODIUM 40 MILLIGRAM(S): 20 TABLET, DELAYED RELEASE ORAL at 05:41

## 2018-12-03 RX ADMIN — SENNA PLUS 2 TABLET(S): 8.6 TABLET ORAL at 21:13

## 2018-12-03 RX ADMIN — Medication 3 MILLIGRAM(S): at 21:13

## 2018-12-03 RX ADMIN — Medication 300 MILLIGRAM(S): at 12:28

## 2018-12-03 RX ADMIN — SPIRONOLACTONE 25 MILLIGRAM(S): 25 TABLET, FILM COATED ORAL at 17:09

## 2018-12-03 RX ADMIN — METFORMIN HYDROCHLORIDE 500 MILLIGRAM(S): 850 TABLET ORAL at 14:44

## 2018-12-03 RX ADMIN — Medication 100 MILLIGRAM(S): at 21:13

## 2018-12-03 RX ADMIN — Medication 1 TABLET(S): at 12:30

## 2018-12-03 RX ADMIN — APIXABAN 2.5 MILLIGRAM(S): 2.5 TABLET, FILM COATED ORAL at 17:09

## 2018-12-03 RX ADMIN — ZINC SULFATE TAB 220 MG (50 MG ZINC EQUIVALENT) 220 MILLIGRAM(S): 220 (50 ZN) TAB at 12:29

## 2018-12-03 RX ADMIN — Medication 200 MICROGRAM(S): at 05:38

## 2018-12-03 RX ADMIN — SPIRONOLACTONE 25 MILLIGRAM(S): 25 TABLET, FILM COATED ORAL at 05:39

## 2018-12-03 NOTE — DISCHARGE NOTE ADULT - NS AS ACTIVITY OBS
Do not drive or operate machinery/Walking-Indoors allowed/Stairs allowed/No Heavy lifting/straining Showering allowed/Walking-Indoors allowed/Stairs allowed/Do not drive or operate machinery/No Heavy lifting/straining

## 2018-12-03 NOTE — DISCHARGE NOTE ADULT - CARE PROVIDER_API CALL
Artemio Cormier), Orthopedics  [address] [place] [address]  Phone: [phone]  Fax: [phone] Artemio Cormier), Orthopedics  [address] [facility] [address]  [place] [address]  Phone: [phone]  Fax: [phone]    Sandra Harris), PhysicalRehab Medicine  [address] [place] [address]  Phone: [phone]  Fax: [phone]

## 2018-12-03 NOTE — DISCHARGE NOTE ADULT - CARE PROVIDERS DIRECT ADDRESSES
[email or web address]

## 2018-12-03 NOTE — DISCHARGE NOTE ADULT - MEDICATION SUMMARY - MEDICATIONS TO TAKE
I will START or STAY ON the medications listed below when I get home from the hospital:    spironolactone 25 mg oral tablet  -- 1 tab(s) by mouth 2 times a day  -- Indication: For Hypertension    oxyCODONE 5 mg oral tablet  -- 1 tab(s) by mouth every 4 hours, As Needed -Pain  MDD:6  Reference #: [id number]   -- Indication: For Pain    celecoxib 200 mg oral capsule  -- 1 cap(s) by mouth   -- Indication: For Prevent Heterotopic bone ossification after surgery    celecoxib 200 mg oral capsule  -- 1 cap(s) by mouth every 12 hours  -- Indication: For ""    olmesartan 20 mg oral tablet  -- 1 tab(s) by mouth once a day  -- Indication: For Hypertension    apixaban 2.5 mg oral tablet  -- 1 tab(s) by mouth every 12 hours  -- Indication: For Prevent blood clots    metFORMIN 500 mg oral tablet, extended release  -- 1 tab(s) by mouth once a day  -- Indication: For Diabetes    allopurinol 300 mg oral tablet  -- 1 tab(s) by mouth once a day  -- Indication: For Gout    Bystolic 2.5 mg oral tablet  -- 1 tab(s) by mouth once a day  -- Indication: For Hypertension    bumetanide 1 mg oral tablet  -- 1 tab(s) by mouth once a day  -- Indication: For Leg swelling    bisacodyl 5 mg oral delayed release tablet  -- 1 tab(s) by mouth every 12 hours, As needed, Constipation  -- Indication: For Constipation    senna oral tablet  -- 2 tab(s) by mouth once a day (at bedtime)  -- Indication: For Constipation    zinc sulfate 220 mg oral capsule  -- 1 cap(s) by mouth once a day  -- Indication: For Nutrition/wound healing    levothyroxine 200 mcg (0.2 mg) oral tablet  -- 1 tab(s) by mouth once a day  -- Indication: For Hypothyroidism    Multiple Vitamins oral tablet  -- 1 tab(s) by mouth once a day  -- Indication: For Nutrition I will START or STAY ON the medications listed below when I get home from the hospital:    spironolactone 25 mg oral tablet  -- 1 tab(s) by mouth 2 times a day  -- Indication: For Hypertension    oxyCODONE 5 mg oral tablet  -- 1 tab(s) by mouth every 4 hours, As Needed -Pain  MDD:6  Reference #: [id number]   -- Indication: For Pain    celecoxib 200 mg oral capsule  -- 1 cap(s) by mouth   -- Indication: For Prevent Heterotopic bone ossification after surgery    celecoxib 200 mg oral capsule  -- 1 cap(s) by mouth every 12 hours  -- Indication: For ""    olmesartan 20 mg oral tablet  -- 1 tab(s) by mouth once a day  -- Indication: For Hypertension    apixaban 2.5 mg oral tablet  -- 1 tab(s) by mouth every 12 hours  -- Indication: For Prevent blood clots    metFORMIN 500 mg oral tablet, extended release  -- 1 tab(s) by mouth once a day  -- Indication: For Diabetes    allopurinol 300 mg oral tablet  -- 1 tab(s) by mouth once a day  -- Indication: For Gout    Bystolic 2.5 mg oral tablet  -- 1 tab(s) by mouth once a day  -- Indication: For Hypertension    bumetanide 1 mg oral tablet  -- 1 tab(s) by mouth once a day  -- Indication: For Leg swelling    bisacodyl 5 mg oral delayed release tablet  -- 1 tab(s) by mouth every 12 hours, As needed, Constipation  -- Indication: For Constipation    senna oral tablet  -- 2 tab(s) by mouth once a day (at bedtime)  -- Indication: For Constipation    zinc sulfate 220 mg oral capsule  -- 1 cap(s) by mouth once a day  -- Indication: For Nutrition/wound healing    levothyroxine 200 mcg (0.2 mg) oral tablet  -- 1 tab(s) by mouth once a day  -- Indication: For Hypothyroidism    Multiple Vitamins oral tablet  -- 1 tab(s) by mouth once a day  -- Indication: For Nutrition

## 2018-12-03 NOTE — PROGRESS NOTE ADULT - ATTENDING COMMENTS
Agree with above.  Medically stable.  Pain under control with current regimen.  Cont comprehensive rehab, dvt ppx, medical management, DC planning

## 2018-12-03 NOTE — DISCHARGE NOTE ADULT - NSFTFSERV1RD_GEN_ALL_CORE
wound care and assessment/rehabilitation services/medication teaching and assessment/observation and assessment/teaching and training

## 2018-12-03 NOTE — DISCHARGE NOTE ADULT - HOSPITAL COURSE
63 year old F with PMH Obesity, HTN, gout, Hypothyroid, T2DM, primary osteoarthritis of the right hip who failed conservative measures underwent uncomplicated elective right total hip arthroplasty (anterior approach) on 11/26 with Dr. Cormier.  Post op made WBAT.  Started on Eliquis for DVT prophylaxis for 35 days (until 1/1/19).  Patient noted to have left shoulder pain and X-ray shoed calcific tendinopathy of left rotator cuff with plan for ortho follow up.  Deemed medically stable for acute rehab on 11/28/18.  In rehab, patient had doppler of RLE due to swelling which was negative for DVT.  She was continued on her home bumex.  She completed course of inpatient rehab and is currently stable for discharge home with close follow up with orthopedic surgeon. 63 year old F with PMH Obesity, HTN, gout, Hypothyroid, T2DM, primary osteoarthritis of the right hip who failed conservative measures underwent uncomplicated elective right total hip arthroplasty (anterior approach) on 11/26 with Dr. Cormier.  Post op made WBAT.  Started on Eliquis for DVT prophylaxis for 35 days (until 1/1/19).  Patient noted to have left shoulder pain and X-ray shoed calcific tendinopathy of left rotator cuff with plan for ortho follow up.  Deemed medically stable for acute rehab on 11/28/18.  In rehab, patient had doppler of RLE due to swelling which was negative for DVT.  She was continued on her home bumex.  She completed course of inpatient rehab and is currently stable for discharge home with close follow up with orthopedic surgeon.  Patient ws evaluated by plastic surgery for skin injury of left perineum.  This could be a varicose vein that is traumatized but location seems unusual.  Suggested patient follow up with her ob/gyn for more work up, to protect area from trauma with padding.  Consider CT with IV contrast to further evaluate and cauterization with silver nitrate

## 2018-12-03 NOTE — DISCHARGE NOTE ADULT - OTHER SIGNIFICANT FINDINGS
FINDINGS:    There is normal compressibility of the right common femoral, femoral and   popliteal veins. No calf vein thrombosis is detected.    The contralateral common femoral vein is patent.    Doppler examination shows normal spontaneous and phasic flow.    IMPRESSION:     No evidence of right lower extremity deep venous thrombosis.

## 2018-12-03 NOTE — DISCHARGE NOTE ADULT - ADDITIONAL INSTRUCTIONS
Follow up with Dr. Cormier on 12/10/18 at 10:00am. Follow up with Dr. Cormier on 12/10/18 at 10:00am.  Follow up with GYN regarding perineal wound.  Consider cauterization with silver nitrate and or CT with IV contrast to further evaluate

## 2018-12-03 NOTE — DISCHARGE NOTE ADULT - PATIENT PORTAL LINK FT
You can access the [email or web address] Patient Portal, offered by [facility], by registering with the following website: [email or web address]

## 2018-12-03 NOTE — PROGRESS NOTE ADULT - SUBJECTIVE AND OBJECTIVE BOX
HISTORY OF PRESENT ILLNESS  63 year old F with PMH Obesity, HTN, gout, Hypothyroid, T2DM, primary osteoarthritis of the right hip who failed conservative measures underwent uncomplicated elective right total hip arthroplasty (anterior approach) on 11/26 with Dr. Cormier.  Post op made WBAT.  Started on Eliquis for DVT prophylaxis for 35 days (until 1/1/19).  Patient noted to have left shoulder pain and X-ray shoed calcific tendinopathy of left rotator cuff with plan for ortho follow up.  Deemed medically stable for acute rehab.          TODAY'S SUBJECTIVE & REVIEW OF SYMPTOMS  Patient seen and evaluated.  Notes some right > left sided lower extremity swelling.  She takes Bumex 1 mg daily and has noticed more swelling since not taking it.  Overall pain is under control.  Some stable numbness proximal anterior lateral thigh.  Last bowel movement yesterday.  She is feeling confident for discharge home soon. Would like to practice transfers into car. In good spirits.  Offers no complaints       [X] Constiutional WNL        [X] Cardio WNL              [X] Resp WNL  [X] GI WNL                            [X]  WNL                    [X] Heme WNL  [X] Endo WNL                       [X] Skin WNL                  [X] MSK right hip pain controlled   [X] Neuro WNL                     [X] Cognitive WNL         [X] Psych WNL      VITALS  Vital Signs Last 24 Hrs  T(C): 36.6 (02 Dec 2018 22:17), Max: 36.7 (02 Dec 2018 09:19)  T(F): 97.9 (02 Dec 2018 22:17), Max: 98.1 (02 Dec 2018 09:19)  HR: 84 (03 Dec 2018 05:34) (71 - 84)  BP: 112/64 (03 Dec 2018 05:34) (112/64 - 128/78)  BP(mean): --  RR: 14 (03 Dec 2018 05:34) (14 - 14)  SpO2: 99% (03 Dec 2018 05:34) (97% - 99%)    PHYSICAL EXAM  Gen - NAD, Comfortable O x 4  	HEENT - NCAT, EOMI, MMM  	Pulm - CTAB, No wheeze, No rhonchi, No crackles  	Cardiovascular - RRR, S1S2, No murmurs  	Abdomen - Soft, NT/ND, +BS  	Extremities - Mild RLE edema, pain to deep palpation of right calf  anterior incision: healing well, intact, no induration +surrounding soft tissue swelling, mildly tender  no erythema or warmth, no oozing  	Neuro-  	   Cognitive - AAOx3  	   Communication - Fluent, No dysarthria  	   Motor - No focal deficits  	                  LEFT    UE - ShAB 5/5, EF 5/5, EE 5/5, WE 5/5,  5/5  	                  RIGHT UE - ShAB 5/5, EF 5/5, EE 5/5, WE 5/5,  5/5  	                  LEFT    LE - HF 5/5, KE 5/5, DF 5/5, PF 5/5  	                  RIGHT LE - HF <2/5 due to pain, KE <2/5 due to pain, DF 5/5, PF 5/5       RECENT LABS/IMAGING  No labs today 12/3        RADIOLOGY/OTHER RESULTS    -----  < from: US Duplex Venous Lower Ext Ltd, Right (12.02.18 @ 08:55) >  IMPRESSION:     No evidence of right lower extremity deep venous thrombosis.    < end of copied text >    MEDICATIONS   MEDICATIONS  (STANDING):  allopurinol 300 milliGRAM(s) Oral daily  apixaban 2.5 milliGRAM(s) Oral <User Schedule>  ascorbic acid 500 milliGRAM(s) Oral two times a day  celecoxib 200 milliGRAM(s) Oral <User Schedule>  docusate sodium 100 milliGRAM(s) Oral three times a day  levothyroxine 200 MICROGram(s) Oral daily  lidocaine   Patch 2 Patch Transdermal daily  losartan 50 milliGRAM(s) Oral daily  melatonin 3 milliGRAM(s) Oral at bedtime  metFORMIN Extended-Release 500 milliGRAM(s) Oral daily  multivitamin 1 Tablet(s) Oral daily  nebivolol 2.5 milliGRAM(s) Oral daily  pantoprazole    Tablet 40 milliGRAM(s) Oral before breakfast  polyethylene glycol 3350 17 Gram(s) Oral daily  senna 2 Tablet(s) Oral at bedtime  spironolactone 25 milliGRAM(s) Oral two times a day  zinc sulfate 220 milliGRAM(s) Oral daily    MEDICATIONS  (PRN):  acetaminophen   Tablet .. 650 milliGRAM(s) Oral every 6 hours PRN Mild Pain (1 - 3)  aluminum hydroxide/magnesium hydroxide/simethicone Suspension 30 milliLiter(s) Oral four times a day PRN Indigestion  bisacodyl 5 milliGRAM(s) Oral every 12 hours PRN Constipation  magnesium hydroxide Suspension 30 milliLiter(s) Oral daily PRN Constipation  oxyCODONE    IR 5 milliGRAM(s) Oral every 3 hours PRN Mild Pain (1 - 3)  oxyCODONE    IR 10 milliGRAM(s) Oral every 3 hours PRN Moderate Pain (4 - 6)      ASSESSMENT/PLAN HISTORY OF PRESENT ILLNESS  63 year old F with PMH Obesity, HTN, gout, Hypothyroid, T2DM, primary osteoarthritis of the right hip who failed conservative measures underwent uncomplicated elective right total hip arthroplasty (anterior approach) on 11/26 with Dr. Cormier.  Post op made WBAT.  Started on Eliquis for DVT prophylaxis for 35 days (until 1/1/19).  Patient noted to have left shoulder pain and X-ray shoed calcific tendinopathy of left rotator cuff with plan for ortho follow up.  Deemed medically stable for acute rehab.          TODAY'S SUBJECTIVE & REVIEW OF SYMPTOMS  Patient seen and evaluated.  Notes some right > left sided lower extremity swelling.  She takes Bumex 1 mg daily and has noticed more swelling since not taking it.  Overall pain is under control.  Some stable numbness proximal anterior lateral thigh.  Last bowel movement yesterday.  She is feeling confident for discharge home soon. Would like to practice transfers into car. In good spirits.  Offers no complaints       [X] Constiutional WNL        [X] Cardio WNL              [X] Resp WNL  [X] GI WNL                            [X]  WNL                    [X] Heme WNL  [X] Endo WNL                       [X] Skin WNL                  [X] MSK right hip pain controlled   [X] Neuro WNL                     [X] Cognitive WNL         [X] Psych WNL      VITALS  Vital Signs Last 24 Hrs  T(C): 36.6 (02 Dec 2018 22:17), Max: 36.7 (02 Dec 2018 09:19)  T(F): 97.9 (02 Dec 2018 22:17), Max: 98.1 (02 Dec 2018 09:19)  HR: 84 (03 Dec 2018 05:34) (71 - 84)  BP: 112/64 (03 Dec 2018 05:34) (112/64 - 128/78)  BP(mean): --  RR: 14 (03 Dec 2018 05:34) (14 - 14)  SpO2: 99% (03 Dec 2018 05:34) (97% - 99%)    PHYSICAL EXAM  Gen - NAD, Comfortable O x 4  	HEENT - NCAT, EOMI, MMM  	Pulm - CTAB, No wheeze, No rhonchi, No crackles  	Cardiovascular - RRR, S1S2, No murmurs  	Abdomen - Soft, NT/ND, +BS  	Extremities - Mild RLE edema, pain to deep palpation of right calf  anterior incision: healing well, intact, no induration +surrounding soft tissue swelling, mildly tender  no erythema or warmth, no oozing  	Neuro-  	   Cognitive - AAOx3  	   Communication - Fluent, No dysarthria  	   Motor - No focal deficits  	                  LEFT    UE - ShAB 5/5, EF 5/5, EE 5/5, WE 5/5,  5/5  	                  RIGHT UE - ShAB 5/5, EF 5/5, EE 5/5, WE 5/5,  5/5  	                  LEFT    LE - HF 5/5, KE 5/5, DF 5/5, PF 5/5  	                  RIGHT LE - HF <2/5 due to pain, KE <2/5 due to pain, DF 5/5, PF 5/5       RECENT LABS/IMAGING  No labs today 12/3        RADIOLOGY/OTHER RESULTS    -----  < from: US Duplex Venous Lower Ext Ltd, Right (12.02.18 @ 08:55) >  IMPRESSION:     No evidence of right lower extremity deep venous thrombosis.    < end of copied text >      MEDICATIONS  (STANDING):  allopurinol 300 milliGRAM(s) Oral daily  apixaban 2.5 milliGRAM(s) Oral <User Schedule>  ascorbic acid 500 milliGRAM(s) Oral two times a day  celecoxib 200 milliGRAM(s) Oral <User Schedule>  docusate sodium 100 milliGRAM(s) Oral three times a day  levothyroxine 200 MICROGram(s) Oral daily  lidocaine   Patch 2 Patch Transdermal daily  losartan 50 milliGRAM(s) Oral daily  melatonin 3 milliGRAM(s) Oral at bedtime  metFORMIN Extended-Release 500 milliGRAM(s) Oral daily  multivitamin 1 Tablet(s) Oral daily  nebivolol 2.5 milliGRAM(s) Oral daily  pantoprazole    Tablet 40 milliGRAM(s) Oral before breakfast  polyethylene glycol 3350 17 Gram(s) Oral daily  senna 2 Tablet(s) Oral at bedtime  spironolactone 25 milliGRAM(s) Oral two times a day  zinc sulfate 220 milliGRAM(s) Oral daily    MEDICATIONS  (PRN):  acetaminophen   Tablet .. 650 milliGRAM(s) Oral every 6 hours PRN Mild Pain (1 - 3)  aluminum hydroxide/magnesium hydroxide/simethicone Suspension 30 milliLiter(s) Oral four times a day PRN Indigestion  bisacodyl 5 milliGRAM(s) Oral every 12 hours PRN Constipation  magnesium hydroxide Suspension 30 milliLiter(s) Oral daily PRN Constipation  oxyCODONE    IR 5 milliGRAM(s) Oral every 3 hours PRN Mild Pain (1 - 3)  oxyCODONE    IR 10 milliGRAM(s) Oral every 3 hours PRN Moderate Pain (4 - 6)      ASSESSMENT/PLAN

## 2018-12-03 NOTE — PROGRESS NOTE ADULT - ASSESSMENT
63 year old F with PMH Morbid Obesity, HTN, gout, Hypothyroid, T2DM, primary osteoarthritis of the right hip s/p elective R THR on 11/26 now with functional, ADL, gait impairment.      #s/p right THR   - continue Comprehensive Rehab Program of PT/OT   - Anterior Total Hip Precautions.  No SLR.  No hip ER when extended.  No hyperextension of hip when standing. This was reviewed in detail with the patient and brother  - Celebrex for HO PPX x 21 days scheduled for 10 AM, 10 PM   - Remove sutures and Prineo 2 weeks post op (12/10/18)   - Weight bearing status: WBAT   - Eliquis (scheduled 6 AM, 6 PM) for DVT PPX until 1/1/19 . Discussed with patient and brother    LW swelling: post op and dependent edema   - restart home Bumex 1 mg daily  - Doppler RLE negative 12/1     #Pain Control  - Tylenol PRN, Oxycodone PRN, Lidoderm patch     #HTN    -  Losartan, Bystolic, Spironolactone     #Gout  - allopurinol    #Pre-DM (HBA1C 5.8)  - metformin  - CC diet     #Morbid obesity  -Monitor effect on rehab  -Metformin    #Hypothyroid  - levothyroxine    #Sleep  - Melatonin 3 mg qHS     Precautions / PROPHYLAXIS:   - Falls, Total ANTERIOR HIP precautions  - Lungs: Aspiration, Incentive Spirometer [patient instructed at the bedside]   - Pressure injury/Skin: Turn Q2hrs while in bed   - DVT: Eliquis      [facility]  [facility]

## 2018-12-04 PROCEDURE — 99232 SBSQ HOSP IP/OBS MODERATE 35: CPT

## 2018-12-04 RX ADMIN — CELECOXIB 200 MILLIGRAM(S): 200 CAPSULE ORAL at 22:37

## 2018-12-04 RX ADMIN — Medication 650 MILLIGRAM(S): at 20:47

## 2018-12-04 RX ADMIN — LIDOCAINE 2 PATCH: 4 CREAM TOPICAL at 17:49

## 2018-12-04 RX ADMIN — Medication 500 MILLIGRAM(S): at 17:36

## 2018-12-04 RX ADMIN — Medication 3 MILLIGRAM(S): at 21:28

## 2018-12-04 RX ADMIN — SPIRONOLACTONE 25 MILLIGRAM(S): 25 TABLET, FILM COATED ORAL at 17:36

## 2018-12-04 RX ADMIN — CELECOXIB 200 MILLIGRAM(S): 200 CAPSULE ORAL at 13:11

## 2018-12-04 RX ADMIN — BUMETANIDE 1 MILLIGRAM(S): 0.25 INJECTION INTRAMUSCULAR; INTRAVENOUS at 05:51

## 2018-12-04 RX ADMIN — ZINC SULFATE TAB 220 MG (50 MG ZINC EQUIVALENT) 220 MILLIGRAM(S): 220 (50 ZN) TAB at 11:21

## 2018-12-04 RX ADMIN — CELECOXIB 200 MILLIGRAM(S): 200 CAPSULE ORAL at 21:27

## 2018-12-04 RX ADMIN — LIDOCAINE 2 PATCH: 4 CREAM TOPICAL at 11:22

## 2018-12-04 RX ADMIN — SPIRONOLACTONE 25 MILLIGRAM(S): 25 TABLET, FILM COATED ORAL at 05:51

## 2018-12-04 RX ADMIN — Medication 1 TABLET(S): at 11:21

## 2018-12-04 RX ADMIN — CELECOXIB 200 MILLIGRAM(S): 200 CAPSULE ORAL at 11:21

## 2018-12-04 RX ADMIN — Medication 100 MILLIGRAM(S): at 05:51

## 2018-12-04 RX ADMIN — APIXABAN 2.5 MILLIGRAM(S): 2.5 TABLET, FILM COATED ORAL at 05:52

## 2018-12-04 RX ADMIN — APIXABAN 2.5 MILLIGRAM(S): 2.5 TABLET, FILM COATED ORAL at 17:36

## 2018-12-04 RX ADMIN — PANTOPRAZOLE SODIUM 40 MILLIGRAM(S): 20 TABLET, DELAYED RELEASE ORAL at 05:51

## 2018-12-04 RX ADMIN — Medication 300 MILLIGRAM(S): at 11:21

## 2018-12-04 RX ADMIN — Medication 650 MILLIGRAM(S): at 21:56

## 2018-12-04 RX ADMIN — LOSARTAN POTASSIUM 50 MILLIGRAM(S): 100 TABLET, FILM COATED ORAL at 05:52

## 2018-12-04 RX ADMIN — NEBIVOLOL HYDROCHLORIDE 2.5 MILLIGRAM(S): 5 TABLET ORAL at 05:51

## 2018-12-04 RX ADMIN — METFORMIN HYDROCHLORIDE 500 MILLIGRAM(S): 850 TABLET ORAL at 11:23

## 2018-12-04 RX ADMIN — Medication 500 MILLIGRAM(S): at 05:51

## 2018-12-04 RX ADMIN — Medication 200 MICROGRAM(S): at 05:51

## 2018-12-04 RX ADMIN — Medication 650 MILLIGRAM(S): at 06:50

## 2018-12-04 RX ADMIN — Medication 650 MILLIGRAM(S): at 05:52

## 2018-12-04 NOTE — PROGRESS NOTE ADULT - ATTENDING COMMENTS
Agree with above.  Pt met all acute inpatient rehab goals.  D/C home in am with home care.  Plastic surgery consult requested for chronic non-healing perineal wound which tends to bleed; monitor wound on Eliquis.

## 2018-12-04 NOTE — PROGRESS NOTE ADULT - SUBJECTIVE AND OBJECTIVE BOX
HISTORY OF PRESENT ILLNESS  63 year old F with PMH Obesity, HTN, gout, Hypothyroid, T2DM, primary osteoarthritis of the right hip who failed conservative measures underwent uncomplicated elective right total hip arthroplasty (anterior approach) on 11/26 with Dr. Cormier.  Post op made WBAT.  Started on Eliquis for DVT prophylaxis for 35 days (until 1/1/19).  Patient noted to have left shoulder pain and X-ray shoed calcific tendinopathy of left rotator cuff with plan for ortho follow up.  Deemed medically stable for acute rehab.          TODAY'S SUBJECTIVE & REVIEW OF SYMPTOMS  Patient seen and evaluated.  Slept well overnight.  Overall pain is under control.  She states brother is unable to take her home today.  Last bowel movement this AM.  Offers no complaints       [X] Constiutional WNL        [X] Cardio WNL              [X] Resp WNL  [X] GI WNL                            [X]  WNL                    [X] Heme WNL  [X] Endo WNL                       [X] Skin WNL                  [X] MSK right hip pain controlled   [X] Neuro WNL                     [X] Cognitive WNL         [X] Psych WNL      VITALS  Vital Signs Last 24 Hrs  T(C): 37.2 (03 Dec 2018 20:36), Max: 37.2 (03 Dec 2018 20:36)  T(F): 98.9 (03 Dec 2018 20:36), Max: 98.9 (03 Dec 2018 20:36)  HR: 74 (04 Dec 2018 05:48) (74 - 80)  BP: 131/71 (04 Dec 2018 05:48) (120/79 - 131/71)  BP(mean): --  RR: 14 (03 Dec 2018 20:36) (14 - 14)  SpO2: 98% (03 Dec 2018 20:36) (98% - 98%)    PHYSICAL EXAM  Gen - NAD, Comfortable O x 4  	HEENT - NCAT, EOMI, MMM  	Pulm - CTAB, No wheeze, No rhonchi, No crackles  	Cardiovascular - RRR, S1S2, No murmurs  	Abdomen - Soft, NT/ND, +BS  	Extremities - Mild RLE edema, pain to deep palpation of right calf  anterior incision: healing well, intact, no induration +surrounding soft tissue swelling, mildly tender  no erythema or warmth, no oozing  	Neuro-  	   Cognitive - AAOx3  	   Communication - Fluent, No dysarthria  	   Motor - No focal deficits  	                  LEFT    UE - ShAB 5/5, EF 5/5, EE 5/5, WE 5/5,  5/5  	                  RIGHT UE - ShAB 5/5, EF 5/5, EE 5/5, WE 5/5,  5/5  	                  LEFT    LE - HF 5/5, KE 5/5, DF 5/5, PF 5/5  	                  RIGHT LE - HF <2/5 due to pain, KE <2/5 due to pain, DF 5/5, PF 5/5       RECENT LABS/IMAGING  No labs today 12/3        RADIOLOGY/OTHER RESULTS    -----  < from: US Duplex Venous Lower Ext Ltd, Right (12.02.18 @ 08:55) >  IMPRESSION:     No evidence of right lower extremity deep venous thrombosis.    < end of copied text >      MEDICATIONS  (STANDING):  allopurinol 300 milliGRAM(s) Oral daily  apixaban 2.5 milliGRAM(s) Oral <User Schedule>  ascorbic acid 500 milliGRAM(s) Oral two times a day  buMETAnide 1 milliGRAM(s) Oral daily  celecoxib 200 milliGRAM(s) Oral <User Schedule>  docusate sodium 100 milliGRAM(s) Oral three times a day  levothyroxine 200 MICROGram(s) Oral daily  lidocaine   Patch 2 Patch Transdermal daily  losartan 50 milliGRAM(s) Oral daily  melatonin 3 milliGRAM(s) Oral at bedtime  metFORMIN Extended-Release 500 milliGRAM(s) Oral daily  multivitamin 1 Tablet(s) Oral daily  nebivolol 2.5 milliGRAM(s) Oral daily  pantoprazole    Tablet 40 milliGRAM(s) Oral before breakfast  polyethylene glycol 3350 17 Gram(s) Oral daily  senna 2 Tablet(s) Oral at bedtime  spironolactone 25 milliGRAM(s) Oral two times a day  zinc sulfate 220 milliGRAM(s) Oral daily    MEDICATIONS  (PRN):  acetaminophen   Tablet .. 650 milliGRAM(s) Oral every 6 hours PRN Mild Pain (1 - 3)  aluminum hydroxide/magnesium hydroxide/simethicone Suspension 30 milliLiter(s) Oral four times a day PRN Indigestion  bisacodyl 5 milliGRAM(s) Oral every 12 hours PRN Constipation  magnesium hydroxide Suspension 30 milliLiter(s) Oral daily PRN Constipation  oxyCODONE    IR 5 milliGRAM(s) Oral every 3 hours PRN Mild Pain (1 - 3)  oxyCODONE    IR 10 milliGRAM(s) Oral every 3 hours PRN Moderate Pain (4 - 6)        ASSESSMENT/PLAN HISTORY OF PRESENT ILLNESS  63 year old F with PMH Obesity, HTN, gout, Hypothyroid, T2DM, primary osteoarthritis of the right hip who failed conservative measures underwent uncomplicated elective right total hip arthroplasty (anterior approach) on 11/26 with Dr. Cormier.  Post op made WBAT.  Started on Eliquis for DVT prophylaxis for 35 days (until 1/1/19).  Patient noted to have left shoulder pain and X-ray shoed calcific tendinopathy of left rotator cuff with plan for ortho follow up.  Deemed medically stable for acute rehab.          TODAY'S SUBJECTIVE & REVIEW OF SYMPTOMS  Patient seen and evaluated.  Slept well overnight.  Overall pain is under control.  She states brother is unable to take her home today.  Last bowel movement this AM.  Offers no complaints       [X] Constiutional WNL        [X] Cardio WNL              [X] Resp WNL  [X] GI WNL                            [X]  WNL                    [X] Heme WNL  [X] Endo WNL                       [X] Skin WNL                  [X] MSK right hip pain controlled   [X] Neuro WNL                     [X] Cognitive WNL         [X] Psych WNL      VITALS  Vital Signs Last 24 Hrs  T(C): 37.2 (03 Dec 2018 20:36), Max: 37.2 (03 Dec 2018 20:36)  T(F): 98.9 (03 Dec 2018 20:36), Max: 98.9 (03 Dec 2018 20:36)  HR: 74 (04 Dec 2018 05:48) (74 - 80)  BP: 131/71 (04 Dec 2018 05:48) (120/79 - 131/71)  BP(mean): --  RR: 14 (03 Dec 2018 20:36) (14 - 14)  SpO2: 98% (03 Dec 2018 20:36) (98% - 98%)    PHYSICAL EXAM  Gen - NAD, Comfortable O x 4  	HEENT - NCAT, EOMI, MMM  	Pulm - CTAB, No wheeze, No rhonchi, No crackles  	Cardiovascular - RRR, S1S2, No murmurs  	Abdomen - Soft, NT/ND, +BS  	Extremities - Mild RLE edema, pain to deep palpation of right calf  anterior incision: healing well, intact, no induration +surrounding soft tissue swelling, mildly tender  no erythema or warmth, no oozing  	Neuro-  	   Cognitive - AAOx3  	   Communication - Fluent, No dysarthria  	   Motor - No focal deficits  	                  LEFT    UE - ShAB 5/5, EF 5/5, EE 5/5, WE 5/5,  5/5  	                  RIGHT UE - ShAB 5/5, EF 5/5, EE 5/5, WE 5/5,  5/5  	                  LEFT    LE - HF 5/5, KE 5/5, DF 5/5, PF 5/5  	                  RIGHT LE - HF <2/5 due to pain, KE <2/5 due to pain, DF 5/5, PF 5/5       Functional status:  Bed mobility: Mod I  Transfers: Mod I  Gait: Amb ' mod I  Stairs: 12 steps 1HR SAC independent  ADLs: UE dress Mod I, LE dress mod I with AE    RECENT LABS/IMAGING  No labs today 12/3        RADIOLOGY/OTHER RESULTS    -----  < from: US Duplex Venous Lower Ext Ltd, Right (12.02.18 @ 08:55) >  IMPRESSION:     No evidence of right lower extremity deep venous thrombosis.    < end of copied text >      MEDICATIONS  (STANDING):  allopurinol 300 milliGRAM(s) Oral daily  apixaban 2.5 milliGRAM(s) Oral <User Schedule>  ascorbic acid 500 milliGRAM(s) Oral two times a day  buMETAnide 1 milliGRAM(s) Oral daily  celecoxib 200 milliGRAM(s) Oral <User Schedule>  docusate sodium 100 milliGRAM(s) Oral three times a day  levothyroxine 200 MICROGram(s) Oral daily  lidocaine   Patch 2 Patch Transdermal daily  losartan 50 milliGRAM(s) Oral daily  melatonin 3 milliGRAM(s) Oral at bedtime  metFORMIN Extended-Release 500 milliGRAM(s) Oral daily  multivitamin 1 Tablet(s) Oral daily  nebivolol 2.5 milliGRAM(s) Oral daily  pantoprazole    Tablet 40 milliGRAM(s) Oral before breakfast  polyethylene glycol 3350 17 Gram(s) Oral daily  senna 2 Tablet(s) Oral at bedtime  spironolactone 25 milliGRAM(s) Oral two times a day  zinc sulfate 220 milliGRAM(s) Oral daily    MEDICATIONS  (PRN):  acetaminophen   Tablet .. 650 milliGRAM(s) Oral every 6 hours PRN Mild Pain (1 - 3)  aluminum hydroxide/magnesium hydroxide/simethicone Suspension 30 milliLiter(s) Oral four times a day PRN Indigestion  bisacodyl 5 milliGRAM(s) Oral every 12 hours PRN Constipation  magnesium hydroxide Suspension 30 milliLiter(s) Oral daily PRN Constipation  oxyCODONE    IR 5 milliGRAM(s) Oral every 3 hours PRN Mild Pain (1 - 3)  oxyCODONE    IR 10 milliGRAM(s) Oral every 3 hours PRN Moderate Pain (4 - 6)        ASSESSMENT/PLAN

## 2018-12-04 NOTE — PROGRESS NOTE ADULT - ASSESSMENT
63 year old F with PMH Morbid Obesity, HTN, gout, Hypothyroid, T2DM, primary osteoarthritis of the right hip s/p elective R THR on 11/26 now with functional, ADL, gait impairment.      #s/p right THR   - continue Comprehensive Rehab Program of PT/OT   - Anterior Total Hip Precautions.  No SLR.  No hip ER when extended.  No hyperextension of hip when standing. This was reviewed in detail with the patient and brother  - Celebrex for HO PPX x 21 days scheduled for 10 AM, 10 PM   - Remove sutures and Prineo 2 weeks post op (12/10/18)   - Weight bearing status: WBAT   - Eliquis (scheduled 6 AM, 6 PM) for DVT PPX until 1/1/19 . Discussed with patient and brother    LW swelling: post op and dependent edema   - Bumex 1 mg daily  - Doppler RLE negative 12/1     #Pain Control  - Tylenol PRN, Oxycodone PRN, Lidoderm patch     #HTN    -  Losartan, Bystolic, Spironolactone     #Gout  - allopurinol    #Pre-DM (HBA1C 5.8)  - metformin  - CC diet     #Morbid obesity  -Monitor effect on rehab  -Metformin    #Hypothyroid  - levothyroxine    #Sleep  - Melatonin 3 mg qHS     Precautions / PROPHYLAXIS:   - Falls, Total ANTERIOR HIP precautions  - Lungs: Aspiration, Incentive Spirometer [patient instructed at the bedside]   - Pressure injury/Skin: Turn Q2hrs while in bed   - DVT: Eliquis      Diet:  Regular + Thins [facility]

## 2018-12-05 VITALS
HEART RATE: 73 BPM | SYSTOLIC BLOOD PRESSURE: 122 MMHG | DIASTOLIC BLOOD PRESSURE: 74 MMHG | RESPIRATION RATE: 14 BRPM | OXYGEN SATURATION: 97 % | TEMPERATURE: 98 F

## 2018-12-05 PROCEDURE — 84134 ASSAY OF PREALBUMIN: CPT

## 2018-12-05 PROCEDURE — 97110 THERAPEUTIC EXERCISES: CPT

## 2018-12-05 PROCEDURE — 97530 THERAPEUTIC ACTIVITIES: CPT

## 2018-12-05 PROCEDURE — 97535 SELF CARE MNGMENT TRAINING: CPT

## 2018-12-05 PROCEDURE — 85027 COMPLETE CBC AUTOMATED: CPT

## 2018-12-05 PROCEDURE — 97167 OT EVAL HIGH COMPLEX 60 MIN: CPT

## 2018-12-05 PROCEDURE — 93971 EXTREMITY STUDY: CPT

## 2018-12-05 PROCEDURE — 99238 HOSP IP/OBS DSCHRG MGMT 30/<: CPT

## 2018-12-05 PROCEDURE — 97116 GAIT TRAINING THERAPY: CPT

## 2018-12-05 PROCEDURE — 80053 COMPREHEN METABOLIC PANEL: CPT

## 2018-12-05 RX ADMIN — CELECOXIB 200 MILLIGRAM(S): 200 CAPSULE ORAL at 12:12

## 2018-12-05 RX ADMIN — Medication 1 TABLET(S): at 12:12

## 2018-12-05 RX ADMIN — LIDOCAINE 2 PATCH: 4 CREAM TOPICAL at 00:23

## 2018-12-05 RX ADMIN — SPIRONOLACTONE 25 MILLIGRAM(S): 25 TABLET, FILM COATED ORAL at 17:25

## 2018-12-05 RX ADMIN — PANTOPRAZOLE SODIUM 40 MILLIGRAM(S): 20 TABLET, DELAYED RELEASE ORAL at 06:13

## 2018-12-05 RX ADMIN — APIXABAN 2.5 MILLIGRAM(S): 2.5 TABLET, FILM COATED ORAL at 06:13

## 2018-12-05 RX ADMIN — APIXABAN 2.5 MILLIGRAM(S): 2.5 TABLET, FILM COATED ORAL at 17:25

## 2018-12-05 RX ADMIN — Medication 200 MICROGRAM(S): at 06:13

## 2018-12-05 RX ADMIN — ZINC SULFATE TAB 220 MG (50 MG ZINC EQUIVALENT) 220 MILLIGRAM(S): 220 (50 ZN) TAB at 12:12

## 2018-12-05 RX ADMIN — METFORMIN HYDROCHLORIDE 500 MILLIGRAM(S): 850 TABLET ORAL at 12:12

## 2018-12-05 RX ADMIN — Medication 300 MILLIGRAM(S): at 12:12

## 2018-12-05 RX ADMIN — CELECOXIB 200 MILLIGRAM(S): 200 CAPSULE ORAL at 12:27

## 2018-12-05 RX ADMIN — Medication 500 MILLIGRAM(S): at 06:13

## 2018-12-05 RX ADMIN — Medication 500 MILLIGRAM(S): at 17:25

## 2018-12-05 NOTE — PROGRESS NOTE ADULT - SUBJECTIVE AND OBJECTIVE BOX
HISTORY OF PRESENT ILLNESS  63 year old F with PMH Obesity, HTN, gout, Hypothyroid, T2DM, primary osteoarthritis of the right hip who failed conservative measures underwent uncomplicated elective right total hip arthroplasty (anterior approach) on 11/26 with Dr. Cormier.  Post op made WBAT.  Started on Eliquis for DVT prophylaxis for 35 days (until 1/1/19).  Patient noted to have left shoulder pain and X-ray shoed calcific tendinopathy of left rotator cuff with plan for ortho follow up.  Deemed medically stable for acute rehab.          TODAY'S SUBJECTIVE & REVIEW OF SYMPTOMS  Patient seen and evaluated.  Slept well overnight.  Pt reports right hip pain is much improved. +BM yesterday.  No other complaints offered.        [X] Constiutional WNL        [X] Cardio WNL              [X] Resp WNL  [X] GI WNL                            [X]  WNL                    [X] Heme WNL  [X] Endo WNL                       [X] Skin WNL                  [X] MSK right hip pain controlled   [X] Neuro WNL                     [X] Cognitive WNL         [X] Psych WNL      Vital Signs Last 24 Hrs  T(C): 36.8 (05 Dec 2018 09:12), Max: 36.8 (04 Dec 2018 10:26)  T(F): 98.2 (05 Dec 2018 09:12), Max: 98.2 (04 Dec 2018 10:26)  HR: 73 (05 Dec 2018 09:12) (64 - 78)  BP: 122/74 (05 Dec 2018 09:12) (102/64 - 128/74)  BP(mean): --  RR: 14 (05 Dec 2018 09:12) (14 - 14)  SpO2: 97% (05 Dec 2018 09:12) (97% - 99%)    PHYSICAL EXAM  Gen - NAD, Comfortable O x 4  	HEENT - NCAT, EOMI, MMM  	Pulm - CTAB, No wheeze, No rhonchi, No crackles  	Cardiovascular - RRR, S1S2, No murmurs  	Abdomen - Soft, NT/ND, +BS  	Extremities - Mild RLE edema, Calves NTTP  anterior incision: healing well with prineo tape intact.  No erythema  	Neuro-  	   Cognitive - AAOx3  	   Communication - Fluent, No dysarthria  	   Motor - No focal deficits  	                  LEFT    UE - ShAB 5/5, EF 5/5, EE 5/5, WE 5/5,  5/5  	                  RIGHT UE - ShAB 5/5, EF 5/5, EE 5/5, WE 5/5,  5/5  	                  LEFT    LE - HF 5/5, KE 5/5, DF 5/5, PF 5/5  	                  RIGHT LE - HF 3-/5, KE 4/5 due to pain, DF 5/5, PF 5/5       Functional status:  Bed mobility: Mod I  Transfers: Mod I  Gait: Amb ' mod I  Stairs: 12 steps 1HR SAC independent  ADLs: UE dress Mod I, LE dress mod I with AE    RECENT LABS/IMAGING                        10.5   9.7   )-----------( 254      ( 29 Nov 2018 07:00 )             33.8     11-29    141  |  102  |  22  ----------------------------<  102<H>  4.1   |  31  |  0.69    Ca    9.5      29 Nov 2018 07:00    TPro  7.3  /  Alb  2.9<L>  /  TBili  0.4  /  DBili  x   /  AST  24  /  ALT  19  /  AlkPhos  60  11-29          RADIOLOGY/OTHER RESULTS    -----  < from: US Duplex Venous Lower Ext Ltd, Right (12.02.18 @ 08:55) >  IMPRESSION:     No evidence of right lower extremity deep venous thrombosis.    < end of copied text >    MEDICATIONS  (STANDING):  allopurinol 300 milliGRAM(s) Oral daily  apixaban 2.5 milliGRAM(s) Oral <User Schedule>  ascorbic acid 500 milliGRAM(s) Oral two times a day  buMETAnide 1 milliGRAM(s) Oral daily  celecoxib 200 milliGRAM(s) Oral <User Schedule>  docusate sodium 100 milliGRAM(s) Oral three times a day  levothyroxine 200 MICROGram(s) Oral daily  lidocaine   Patch 2 Patch Transdermal daily  losartan 50 milliGRAM(s) Oral daily  melatonin 3 milliGRAM(s) Oral at bedtime  metFORMIN Extended-Release 500 milliGRAM(s) Oral daily  multivitamin 1 Tablet(s) Oral daily  nebivolol 2.5 milliGRAM(s) Oral daily  pantoprazole    Tablet 40 milliGRAM(s) Oral before breakfast  polyethylene glycol 3350 17 Gram(s) Oral daily  senna 2 Tablet(s) Oral at bedtime  spironolactone 25 milliGRAM(s) Oral two times a day  zinc sulfate 220 milliGRAM(s) Oral daily    MEDICATIONS  (PRN):  acetaminophen   Tablet .. 650 milliGRAM(s) Oral every 6 hours PRN Mild Pain (1 - 3)  aluminum hydroxide/magnesium hydroxide/simethicone Suspension 30 milliLiter(s) Oral four times a day PRN Indigestion  bisacodyl 5 milliGRAM(s) Oral every 12 hours PRN Constipation  magnesium hydroxide Suspension 30 milliLiter(s) Oral daily PRN Constipation  oxyCODONE    IR 5 milliGRAM(s) Oral every 3 hours PRN Mild Pain (1 - 3)  oxyCODONE    IR 10 milliGRAM(s) Oral every 3 hours PRN Moderate Pain (4 - 6)

## 2018-12-05 NOTE — CONSULT NOTE ADULT - ASSESSMENT
63 year old female obese with skin injury of left perineum with sign of infection.  This could be a varicose vein that is traumatized but location seems unusual  -suggest patient follow up with her ob/gyn for more work up  -suggested to patient to protect area from trauma with padding  -Thank you
64 y/o F with PMH morbid obesity (BMI 50), DM, hypothyroid, primary OA s/p right hip replacement now with functional deficit and impaired mobility     #Primary OA s/p Right hip replacement   PT/OT as tolerated, pain management with bowel regimen     #DM II  continue metformin    #Morbid Obesity- BMI 50  nutrition counseling carb consistent diet     #HTN  controlled on current meds, losartan, nebivolol, spironolactone     #Gout  no acute issues, continue allopurinol     DVT/GI ppx with apixaban until 1/1/19 , protonix

## 2018-12-05 NOTE — PROGRESS NOTE ADULT - ASSESSMENT
63 year old F with PMH Morbid Obesity, HTN, gout, Hypothyroid, T2DM, primary osteoarthritis of the right hip s/p elective R THR on 11/26 now with functional, ADL, gait impairment.      #s/p right THR   - continue Comprehensive Rehab Program of PT/OT   - Anterior Total Hip Precautions.  No SLR.  No hip ER when extended.  No hyperextension of hip when standing. This was reviewed in detail with the patient and brother  - Celebrex for HO PPX x 21 days scheduled for 10 AM, 10 PM   - Remove sutures and Prineo 2 weeks post op (12/10/18)   - Weight bearing status: WBAT   - Eliquis (scheduled 6 AM, 6 PM) for DVT PPX until 1/1/19 . Discussed with patient and brother    LW swelling: post op and dependent edema   - Bumex 1 mg daily  - Doppler RLE negative 12/1     #Pain Control  - Tylenol PRN, Oxycodone PRN, Lidoderm patch     #HTN    -  Losartan, Bystolic, Spironolactone     #Gout  - allopurinol    #Pre-DM (HBA1C 5.8)  - metformin  - CC diet     #Morbid obesity  -Monitor effect on rehab  -Metformin    #Hypothyroid  - levothyroxine    #Sleep  - Melatonin 3 mg qHS     Precautions / PROPHYLAXIS:   - Falls, Total ANTERIOR HIP precautions  - Lungs: Aspiration, Incentive Spirometer [patient instructed at the bedside]   - Pressure injury/Skin: Turn Q2hrs while in bed   - DVT: Eliquis      Diet:  Regular + Thins [[facility]      #skin injury of left perineum.  Appreciate plastic surgery consult.  This could be a varicose vein that is traumatized but location seems unusual  -suggest patient follow up with her ob/gyn for more work up  -suggested to patient to protect area from trauma with padding    Pt met all acute inpatient rehab goals.  Medically stable.  D/C home with home care.

## 2018-12-05 NOTE — CONSULT NOTE ADULT - SUBJECTIVE AND OBJECTIVE BOX
CC:  Patient is a 63y old  Female who presents with a chief complaint of s/p Right Total Hip replacement (04 Dec 2018 10:07)  Asked to evaluate perineal wound      HPI:  63 year old F with PMH Obesity, HTN, gout, Hypothyroid, T2DM, primary osteoarthritis of the right hip who failed conservative measures underwent uncomplicated elective right total hip arthroplasty (anterior approach) on 11/26 with Dr. Cormier.  Post op made WBAT.  Started on Eliquis for DVT prophylaxis for 35 days (until 1/1/19).  Patient noted to have left shoulder pain and X-ray shoed calcific tendinopathy of left rotator cuff with plan for ortho follow up.  Deemed medically stable for acute rehab. (28 Nov 2018 16:48)    In terms of the perineal wound, patients states it started in June of 2018, she felt 3 small lumps in the area, squeezed them, pus returned then blood.  Patient has seen a dermatologist who may have injected in area around lesion.  She saw ob/gyn who gave her antibiotics.  She states ob/gyn is interested in possible biopsy if no resolution.  Yesterday, patient noted bloody discharged    PAST MEDICAL & SURGICAL HISTORY:  Venous insufficiency of lower extremity  History of gout  Type 2 diabetes mellitus  BMI 50.0-59.9, adult  Essential hypertension  Hypothyroid  Primary osteoarthritis of right hip  S/P hysterectomy: total  S/P thyroidectomy: partial  S/P carpal tunnel release: left  History of arm fracture: 1993-right      Allergies    No Known Allergies    Intolerances        SOCIAL HISTORY          Smoking: Yes [ ]  No [ ]   ______pk yrs          ETOH  Yes [ ]  No [ ]  Social [ ]          DRUGS:  Yes [ ]  No [ ]  if so what______________    FAMILY HISTORY:  Family history of heart attack (Father)      MEDICATIONS  (STANDING):  allopurinol 300 milliGRAM(s) Oral daily  apixaban 2.5 milliGRAM(s) Oral <User Schedule>  ascorbic acid 500 milliGRAM(s) Oral two times a day  buMETAnide 1 milliGRAM(s) Oral daily  celecoxib 200 milliGRAM(s) Oral <User Schedule>  docusate sodium 100 milliGRAM(s) Oral three times a day  levothyroxine 200 MICROGram(s) Oral daily  lidocaine   Patch 2 Patch Transdermal daily  losartan 50 milliGRAM(s) Oral daily  melatonin 3 milliGRAM(s) Oral at bedtime  metFORMIN Extended-Release 500 milliGRAM(s) Oral daily  multivitamin 1 Tablet(s) Oral daily  nebivolol 2.5 milliGRAM(s) Oral daily  pantoprazole    Tablet 40 milliGRAM(s) Oral before breakfast  polyethylene glycol 3350 17 Gram(s) Oral daily  senna 2 Tablet(s) Oral at bedtime  spironolactone 25 milliGRAM(s) Oral two times a day  zinc sulfate 220 milliGRAM(s) Oral daily    MEDICATIONS  (PRN):  acetaminophen   Tablet .. 650 milliGRAM(s) Oral every 6 hours PRN Mild Pain (1 - 3)  aluminum hydroxide/magnesium hydroxide/simethicone Suspension 30 milliLiter(s) Oral four times a day PRN Indigestion  bisacodyl 5 milliGRAM(s) Oral every 12 hours PRN Constipation  magnesium hydroxide Suspension 30 milliLiter(s) Oral daily PRN Constipation  oxyCODONE    IR 5 milliGRAM(s) Oral every 3 hours PRN Mild Pain (1 - 3)  oxyCODONE    IR 10 milliGRAM(s) Oral every 3 hours PRN Moderate Pain (4 - 6)         Review of systems:  General:  no fever or chills  Pulmonary:  no SOB  Cardiac:  denies chest pain, palpitations  GI:  no nausea, vomitting, or abddominal pain  :  no increase frequency, or burning  Heme:  no easy bruising with minor trauma  Musculoskeletal:  No history of back pain or trauma  Skin:  no history of rashes, injury, trauma  Neuro:  no weakness         Vital Signs Last 24 Hrs  T(C): 36.7 (04 Dec 2018 21:58), Max: 36.8 (04 Dec 2018 10:26)  T(F): 98 (04 Dec 2018 21:58), Max: 98.2 (04 Dec 2018 10:26)  HR: 64 (05 Dec 2018 06:08) (64 - 78)  BP: 102/64 (05 Dec 2018 06:08) (102/64 - 128/74)  BP(mean): --  RR: 14 (05 Dec 2018 06:08) (14 - 14)  SpO2: 99% (05 Dec 2018 06:08) (99% - 99%)    Physical Exam:    General:  Appears stated age, obese, no distress  Extremities:  full range of motion  Skin:   On the very proximal left upper inner thigh with gluteal cheeks  (aide present for exam), there is a small skin disruption associated with tortuous cutaneous lesion that is flat and nontender, no mass appreciated, a small amount of dark blood was expressed  Neuro/Psych:  Alert, oriented tp time, place and person       LABS:                RADIOLOGY & ADDITIONAL STUDIES:    Risks, benefits, and alternatives to treatment discussed. All questions answered with understanding.    Procedure Performed:  (  )Yes     (  )No  Name of Procedure:      [  ]Debridement     [  ]I&D    [  ]Laceration Repair     [  ]Other:  (  )partial thickness     (  )full thickness     (  )subcutaneous     (  )muscle/tendon     (  )bone  (  )sharp     (  )surgical
63 year old F with PMH Obesity, HTN, gout, Hypothyroid, T2DM, primary osteoarthritis of the right hip who failed conservative measures underwent uncomplicated elective right total hip arthroplasty (anterior approach) on 11/26 with Dr. Cormier.  Post op made WBAT.  Started on Eliquis for DVT prophylaxis for 35 days (until 1/1/19).  Patient noted to have left shoulder pain and X-ray shoed calcific tendinopathy of left rotator cuff with plan for ortho follow up.  Deemed medically stable for acute rehab. (28 Nov 2018 16:48)      PAST MEDICAL & SURGICAL HISTORY:  Venous insufficiency of lower extremity  History of gout  Type 2 diabetes mellitus  BMI 50.0-59.9, adult  Essential hypertension  Hypothyroid  Primary osteoarthritis of right hip  S/P hysterectomy: total  S/P thyroidectomy: partial  S/P carpal tunnel release: left  History of arm fracture: 1993-right    Family hx reviewed and non contributory     Substance Use (street drugs): (  ) never used  (  ) other:  Tobacco Usage:  (   ) never smoked   (   ) former smoker   (   ) current smoker  (     ) pack year  Alcohol Usage:  Sexual History:   Recent Travel:      Allergies    No Known Allergies    Intolerances        MEDICATIONS  (STANDING):  allopurinol 300 milliGRAM(s) Oral daily  apixaban 2.5 milliGRAM(s) Oral every 12 hours  ascorbic acid 500 milliGRAM(s) Oral two times a day  celecoxib 200 milliGRAM(s) Oral every 12 hours  docusate sodium 100 milliGRAM(s) Oral three times a day  levothyroxine 200 MICROGram(s) Oral daily  lidocaine   Patch 2 Patch Transdermal daily  losartan 50 milliGRAM(s) Oral daily  metFORMIN Extended-Release 500 milliGRAM(s) Oral daily  multivitamin 1 Tablet(s) Oral daily  nebivolol 2.5 milliGRAM(s) Oral daily  pantoprazole    Tablet 40 milliGRAM(s) Oral before breakfast  polyethylene glycol 3350 17 Gram(s) Oral daily  senna 2 Tablet(s) Oral at bedtime  spironolactone 25 milliGRAM(s) Oral two times a day  zinc sulfate 220 milliGRAM(s) Oral daily    MEDICATIONS  (PRN):  acetaminophen   Tablet .. 650 milliGRAM(s) Oral every 6 hours PRN Mild Pain (1 - 3)  aluminum hydroxide/magnesium hydroxide/simethicone Suspension 30 milliLiter(s) Oral four times a day PRN Indigestion  bisacodyl 5 milliGRAM(s) Oral every 12 hours PRN Constipation  magnesium hydroxide Suspension 30 milliLiter(s) Oral daily PRN Constipation  oxyCODONE    IR 5 milliGRAM(s) Oral every 3 hours PRN Mild Pain (1 - 3)  oxyCODONE    IR 10 milliGRAM(s) Oral every 3 hours PRN Moderate Pain (4 - 6)      REVIEW OF SYSTEMS:  CONSTITUTIONAL: No fever, weight loss, or fatigue  RESPIRATORY: No cough, wheezing, chills or hemoptysis; No shortness of breath  CARDIOVASCULAR: No chest pain, palpitations, dizziness, or leg swelling  GASTROINTESTINAL: No abdominal or epigastric pain. No nausea, vomiting, or hematemesis; No diarrhea or constipation. No melena or hematochezia.  GENITOURINARY: No dysuria, frequency, hematuria, or incontinence  MUSCULOSKELETAL: right hip pain       ALL ROS REVIEWED AND NORMAL EXCEPT AS STATED ABOVE    Vital Signs Last 24 Hrs  T(C): 36.6 (29 Nov 2018 08:59), Max: 36.9 (28 Nov 2018 20:47)  T(F): 97.8 (29 Nov 2018 08:59), Max: 98.5 (28 Nov 2018 20:47)  HR: 77 (29 Nov 2018 08:59) (68 - 82)  BP: 127/73 (29 Nov 2018 08:59) (119/69 - 131/83)  BP(mean): --  RR: 14 (29 Nov 2018 08:59) (14 - 18)  SpO2: 96% (29 Nov 2018 08:59) (95% - 99%)    PHYSICAL EXAM:  GENERAL: NAD  NERVOUS SYSTEM:  Alert & Oriented X3  CHEST/LUNG: Clear lungs bilaterally; No rales, rhonchi, wheezing, or rubs  HEART: S1S2, RRR   ABDOMEN: Soft, morbidly obese, Nontender, Nondistended; + Bowel sounds  EXTREMITIES:  +pulses b/l, +1 edema           LABS:                        10.5   9.7   )-----------( 254      ( 29 Nov 2018 07:00 )             33.8     11-29    141  |  102  |  22  ----------------------------<  102<H>  4.1   |  31  |  0.69    Ca    9.5      29 Nov 2018 07:00    TPro  7.3  /  Alb  2.9<L>  /  TBili  0.4  /  DBili  x   /  AST  24  /  ALT  19  /  AlkPhos  60  11-29         CAPILLARY BLOOD GLUCOSE      POCT Blood Glucose.: 93 mg/dL (28 Nov 2018 12:08)            RADIOLOGY & ADDITIONAL TESTS:  < from: Xray Shoulder 2 Views, Left (11.27.18 @ 10:20) >    IMPRESSION: No acute fractures or dislocations.    Calcific tendinopathy of the left rotator cuff.    < end of copied text >    Consultant(s) Notes Reviewed:  [x ] YES  [ ] NO  Care Discussed with Consultants/Other Providers [ x] YES  [ ] NO  Imaging Personally Reviewed:  [ ] YES  [ ] NO

## 2018-12-05 NOTE — PROGRESS NOTE ADULT - REASON FOR ADMISSION
s/p Right Total Hip replacement

## 2018-12-06 ENCOUNTER — OTHER (OUTPATIENT)
Age: 63
End: 2018-12-06

## 2018-12-06 RX ORDER — NEBIVOLOL HYDROCHLORIDE 2.5 MG/1
2.5 TABLET ORAL
Qty: 90 | Refills: 0 | Status: COMPLETED | COMMUNITY
Start: 2016-12-06 | End: 2018-12-06

## 2018-12-10 ENCOUNTER — APPOINTMENT (OUTPATIENT)
Dept: ORTHOPEDIC SURGERY | Facility: CLINIC | Age: 63
End: 2018-12-10
Payer: COMMERCIAL

## 2018-12-10 VITALS
BODY MASS INDEX: 45.99 KG/M2 | SYSTOLIC BLOOD PRESSURE: 135 MMHG | HEART RATE: 68 BPM | DIASTOLIC BLOOD PRESSURE: 79 MMHG | HEIGHT: 67 IN | WEIGHT: 293 LBS

## 2018-12-10 PROCEDURE — 99024 POSTOP FOLLOW-UP VISIT: CPT

## 2018-12-10 PROCEDURE — 73502 X-RAY EXAM HIP UNI 2-3 VIEWS: CPT

## 2018-12-18 ENCOUNTER — MEDICATION RENEWAL (OUTPATIENT)
Age: 63
End: 2018-12-18

## 2018-12-19 ENCOUNTER — MEDICATION RENEWAL (OUTPATIENT)
Age: 63
End: 2018-12-19

## 2018-12-28 ENCOUNTER — CHART COPY (OUTPATIENT)
Age: 63
End: 2018-12-28

## 2019-01-29 ENCOUNTER — APPOINTMENT (OUTPATIENT)
Dept: ORTHOPEDIC SURGERY | Facility: CLINIC | Age: 64
End: 2019-01-29
Payer: COMMERCIAL

## 2019-01-29 VITALS
HEIGHT: 67 IN | SYSTOLIC BLOOD PRESSURE: 133 MMHG | BODY MASS INDEX: 45.99 KG/M2 | DIASTOLIC BLOOD PRESSURE: 81 MMHG | WEIGHT: 293 LBS | HEART RATE: 60 BPM

## 2019-01-29 DIAGNOSIS — M17.12 UNILATERAL PRIMARY OSTEOARTHRITIS, LEFT KNEE: ICD-10-CM

## 2019-01-29 DIAGNOSIS — M16.11 UNILATERAL PRIMARY OSTEOARTHRITIS, RIGHT HIP: ICD-10-CM

## 2019-01-29 PROCEDURE — 99024 POSTOP FOLLOW-UP VISIT: CPT

## 2019-01-29 PROCEDURE — 73502 X-RAY EXAM HIP UNI 2-3 VIEWS: CPT

## 2019-01-29 PROCEDURE — 20610 DRAIN/INJ JOINT/BURSA W/O US: CPT | Mod: 79,LT

## 2019-01-29 RX ADMIN — METHYLPREDNISOLONE ACETATE 2 MG/ML: 40 INJECTION, SUSPENSION INTRALESIONAL; INTRAMUSCULAR; INTRASYNOVIAL; SOFT TISSUE at 00:00

## 2019-01-29 RX ADMIN — LIDOCAINE HYDROCHLORIDE 3 %: 10 INJECTION, SOLUTION INFILTRATION; PERINEURAL at 00:00

## 2019-01-30 RX ORDER — LIDOCAINE HYDROCHLORIDE 10 MG/ML
1 INJECTION, SOLUTION INFILTRATION; PERINEURAL
Refills: 0 | Status: COMPLETED | OUTPATIENT
Start: 2019-01-29

## 2019-01-30 RX ORDER — METHYLPRED ACET/NACL,ISO-OS/PF 40 MG/ML
40 VIAL (ML) INJECTION
Qty: 1 | Refills: 0 | Status: COMPLETED | OUTPATIENT
Start: 2019-01-29

## 2019-03-19 ENCOUNTER — MEDICATION RENEWAL (OUTPATIENT)
Age: 64
End: 2019-03-19

## 2019-03-22 ENCOUNTER — MEDICATION RENEWAL (OUTPATIENT)
Age: 64
End: 2019-03-22

## 2019-04-02 ENCOUNTER — APPOINTMENT (OUTPATIENT)
Dept: ORTHOPEDIC SURGERY | Facility: CLINIC | Age: 64
End: 2019-04-02
Payer: COMMERCIAL

## 2019-04-02 VITALS — SYSTOLIC BLOOD PRESSURE: 142 MMHG | DIASTOLIC BLOOD PRESSURE: 84 MMHG | HEART RATE: 63 BPM

## 2019-04-02 PROCEDURE — 99212 OFFICE O/P EST SF 10 MIN: CPT

## 2019-04-02 PROCEDURE — 73502 X-RAY EXAM HIP UNI 2-3 VIEWS: CPT

## 2019-05-04 ENCOUNTER — TRANSCRIPTION ENCOUNTER (OUTPATIENT)
Age: 64
End: 2019-05-04

## 2019-07-08 ENCOUNTER — MEDICATION RENEWAL (OUTPATIENT)
Age: 64
End: 2019-07-08

## 2019-09-06 ENCOUNTER — RX RENEWAL (OUTPATIENT)
Age: 64
End: 2019-09-06

## 2019-11-01 NOTE — H&P ADULT - NSTOBACCOSCREENHP_GEN_A_NCS
Azathioprine Pregnancy And Lactation Text: This medication is Pregnancy Category D and isn't considered safe during pregnancy. It is unknown if this medication is excreted in breast milk. No

## 2019-11-14 ENCOUNTER — APPOINTMENT (OUTPATIENT)
Dept: INTERNAL MEDICINE | Facility: CLINIC | Age: 64
End: 2019-11-14
Payer: COMMERCIAL

## 2019-11-14 VITALS — WEIGHT: 293 LBS | BODY MASS INDEX: 45.99 KG/M2 | HEIGHT: 67 IN

## 2019-11-14 VITALS — SYSTOLIC BLOOD PRESSURE: 126 MMHG | TEMPERATURE: 97.9 F | DIASTOLIC BLOOD PRESSURE: 62 MMHG

## 2019-11-14 DIAGNOSIS — G56.00 CARPAL TUNNEL SYNDROME, UNSPECIFIED UPPER LIMB: ICD-10-CM

## 2019-11-14 DIAGNOSIS — J06.9 ACUTE UPPER RESPIRATORY INFECTION, UNSPECIFIED: ICD-10-CM

## 2019-11-14 DIAGNOSIS — M54.14 RADICULOPATHY, THORACIC REGION: ICD-10-CM

## 2019-11-14 DIAGNOSIS — Z88.9 ALLERGY STATUS TO UNSPECIFIED DRUGS, MEDICAMENTS AND BIOLOGICAL SUBSTANCES: ICD-10-CM

## 2019-11-14 DIAGNOSIS — Z87.09 PERSONAL HISTORY OF OTHER DISEASES OF THE RESPIRATORY SYSTEM: ICD-10-CM

## 2019-11-14 DIAGNOSIS — R31.29 OTHER MICROSCOPIC HEMATURIA: ICD-10-CM

## 2019-11-14 DIAGNOSIS — I87.2 VENOUS INSUFFICIENCY (CHRONIC) (PERIPHERAL): ICD-10-CM

## 2019-11-14 DIAGNOSIS — R09.82 POSTNASAL DRIP: ICD-10-CM

## 2019-11-14 DIAGNOSIS — Z87.891 PERSONAL HISTORY OF NICOTINE DEPENDENCE: ICD-10-CM

## 2019-11-14 DIAGNOSIS — Z82.49 FAMILY HISTORY OF ISCHEMIC HEART DISEASE AND OTHER DISEASES OF THE CIRCULATORY SYSTEM: ICD-10-CM

## 2019-11-14 DIAGNOSIS — M16.10 UNILATERAL PRIMARY OSTEOARTHRITIS, UNSPECIFIED HIP: ICD-10-CM

## 2019-11-14 PROCEDURE — 36415 COLL VENOUS BLD VENIPUNCTURE: CPT

## 2019-11-14 PROCEDURE — 99214 OFFICE O/P EST MOD 30 MIN: CPT | Mod: 25

## 2019-11-14 RX ORDER — UBIDECARENONE/VIT E ACET 100MG-5
50 MCG CAPSULE ORAL DAILY
Refills: 0 | Status: ACTIVE | COMMUNITY

## 2019-11-14 NOTE — HISTORY OF PRESENT ILLNESS
[de-identified] : s/p total hip replacement right doing well, not needing cane most of the time, pain zero from hip\par has chronic back pain\par has runny nose fuzziness in throat, cough when laughs\par no fevers\par did not try OTC meds\par also has morbid obesity, HTN and diabetes/prediabetes to check today [FreeTextEntry1] : here to follow ongoing conditions\par back pain\par fuzziness in chest and upper throat and runny nose

## 2019-11-14 NOTE — REVIEW OF SYSTEMS
[Fatigue] : fatigue [Nasal Discharge] : nasal discharge [Sore Throat] : sore throat [Postnasal Drip] : postnasal drip [Cough] : cough [Joint Pain] : joint pain [Back Pain] : back pain [Negative] : Heme/Lymph [Fever] : no fever [Hot Flashes] : no hot flashes [Chills] : no chills [Recent Change In Weight] : ~T no recent weight change [Night Sweats] : no night sweats [Nosebleed] : no nosebleeds [Earache] : no earache [Hearing Loss] : no hearing loss [Wheezing] : no wheezing [Hoarseness] : no hoarseness [Shortness Of Breath] : no shortness of breath [Dyspnea on Exertion] : no dyspnea on exertion

## 2019-11-14 NOTE — PHYSICAL EXAM
[No Acute Distress] : no acute distress [Well Developed] : well developed [Well Nourished] : well nourished [PERRL] : pupils equal round and reactive to light [Normal Sclera/Conjunctiva] : normal sclera/conjunctiva [Normal Oropharynx] : the oropharynx was normal [Normal Outer Ear/Nose] : the outer ears and nose were normal in appearance [Normal TMs] : both tympanic membranes were normal [No Lymphadenopathy] : no lymphadenopathy [No JVD] : no jugular venous distention [No Respiratory Distress] : no respiratory distress  [Supple] : supple [No Accessory Muscle Use] : no accessory muscle use [Clear to Auscultation] : lungs were clear to auscultation bilaterally [Normal Rate] : normal rate  [Regular Rhythm] : with a regular rhythm [Normal S1, S2] : normal S1 and S2 [___ +] : bilateral [unfilled]U+ pretibial pitting edema

## 2019-11-14 NOTE — ASSESSMENT
[FreeTextEntry1] : Morbid obesity with multiple conditions related to that\par Hypertension well controlled on current medications\par Well-controlled on current medications\par Back pain may need epidural injections\par Diabetes and prediabetes to check bloods today\par Mucinex DM for current symptoms likely viral

## 2019-11-18 LAB
ALBUMIN SERPL ELPH-MCNC: 4.1 G/DL
ALP BLD-CCNC: 63 U/L
ALT SERPL-CCNC: 13 U/L
ANION GAP SERPL CALC-SCNC: 13 MMOL/L
AST SERPL-CCNC: 11 U/L
BASOPHILS # BLD AUTO: 0.06 K/UL
BASOPHILS NFR BLD AUTO: 0.7 %
BILIRUB SERPL-MCNC: 0.3 MG/DL
BUN SERPL-MCNC: 24 MG/DL
CALCIUM SERPL-MCNC: 9.8 MG/DL
CHLORIDE SERPL-SCNC: 100 MMOL/L
CHOLEST SERPL-MCNC: 184 MG/DL
CHOLEST/HDLC SERPL: 3.8 RATIO
CK SERPL-CCNC: 32 U/L
CO2 SERPL-SCNC: 27 MMOL/L
CREAT SERPL-MCNC: 0.88 MG/DL
EOSINOPHIL # BLD AUTO: 0.16 K/UL
EOSINOPHIL NFR BLD AUTO: 1.9 %
ESTIMATED AVERAGE GLUCOSE: 131 MG/DL
GLUCOSE SERPL-MCNC: 111 MG/DL
HBA1C MFR BLD HPLC: 6.2 %
HCT VFR BLD CALC: 40.1 %
HDLC SERPL-MCNC: 49 MG/DL
HGB BLD-MCNC: 11.6 G/DL
IMM GRANULOCYTES NFR BLD AUTO: 0.7 %
LDLC SERPL CALC-MCNC: 114 MG/DL
LYMPHOCYTES # BLD AUTO: 1.46 K/UL
LYMPHOCYTES NFR BLD AUTO: 17.6 %
MAN DIFF?: NORMAL
MCHC RBC-ENTMCNC: 24.6 PG
MCHC RBC-ENTMCNC: 28.9 GM/DL
MCV RBC AUTO: 85.1 FL
MONOCYTES # BLD AUTO: 0.5 K/UL
MONOCYTES NFR BLD AUTO: 6 %
NEUTROPHILS # BLD AUTO: 6.05 K/UL
NEUTROPHILS NFR BLD AUTO: 73.1 %
PLATELET # BLD AUTO: 333 K/UL
POTASSIUM SERPL-SCNC: 4.9 MMOL/L
PROT SERPL-MCNC: 7.3 G/DL
RBC # BLD: 4.71 M/UL
RBC # FLD: 17.2 %
SODIUM SERPL-SCNC: 140 MMOL/L
T4 FREE SERPL-MCNC: 1.5 NG/DL
TRIGL SERPL-MCNC: 105 MG/DL
TSH SERPL-ACNC: 2.28 UIU/ML
URATE SERPL-MCNC: 5.1 MG/DL
WBC # FLD AUTO: 8.29 K/UL

## 2019-12-13 ENCOUNTER — RX RENEWAL (OUTPATIENT)
Age: 64
End: 2019-12-13

## 2019-12-31 ENCOUNTER — MEDICATION RENEWAL (OUTPATIENT)
Age: 64
End: 2019-12-31

## 2020-01-03 ENCOUNTER — APPOINTMENT (OUTPATIENT)
Dept: INTERNAL MEDICINE | Facility: CLINIC | Age: 65
End: 2020-01-03
Payer: COMMERCIAL

## 2020-01-03 ENCOUNTER — NON-APPOINTMENT (OUTPATIENT)
Age: 65
End: 2020-01-03

## 2020-01-03 VITALS — HEIGHT: 66 IN | BODY MASS INDEX: 47.09 KG/M2 | WEIGHT: 293 LBS

## 2020-01-03 VITALS — SYSTOLIC BLOOD PRESSURE: 120 MMHG | DIASTOLIC BLOOD PRESSURE: 70 MMHG

## 2020-01-03 DIAGNOSIS — Z00.00 ENCOUNTER FOR GENERAL ADULT MEDICAL EXAMINATION W/OUT ABNORMAL FINDINGS: ICD-10-CM

## 2020-01-03 DIAGNOSIS — L29.9 PRURITUS, UNSPECIFIED: ICD-10-CM

## 2020-01-03 DIAGNOSIS — I87.2 VENOUS INSUFFICIENCY (CHRONIC) (PERIPHERAL): ICD-10-CM

## 2020-01-03 DIAGNOSIS — M17.0 BILATERAL PRIMARY OSTEOARTHRITIS OF KNEE: ICD-10-CM

## 2020-01-03 DIAGNOSIS — M47.817 SPONDYLOSIS W/OUT MYELOPATHY OR RADICULOPATHY, LUMBOSACRAL REGION: ICD-10-CM

## 2020-01-03 DIAGNOSIS — M10.9 GOUT, UNSPECIFIED: ICD-10-CM

## 2020-01-03 LAB
BILIRUB UR QL STRIP: NEGATIVE
GLUCOSE UR-MCNC: NEGATIVE
HCG UR QL: 0.2 EU/DL
HGB UR QL STRIP.AUTO: NEGATIVE
KETONES UR-MCNC: NEGATIVE
LEUKOCYTE ESTERASE UR QL STRIP: NEGATIVE
NITRITE UR QL STRIP: NEGATIVE
PH UR STRIP: 5
PROT UR STRIP-MCNC: NEGATIVE
SP GR UR STRIP: 1.02

## 2020-01-03 PROCEDURE — 99396 PREV VISIT EST AGE 40-64: CPT | Mod: 25

## 2020-01-03 PROCEDURE — 93000 ELECTROCARDIOGRAM COMPLETE: CPT | Mod: 59

## 2020-01-03 PROCEDURE — G0444 DEPRESSION SCREEN ANNUAL: CPT

## 2020-01-03 PROCEDURE — 81003 URINALYSIS AUTO W/O SCOPE: CPT | Mod: NC,QW

## 2020-01-03 RX ORDER — TRAMADOL HYDROCHLORIDE 50 MG/1
50 TABLET, COATED ORAL
Qty: 100 | Refills: 0 | Status: COMPLETED | COMMUNITY
Start: 2017-05-02 | End: 2020-01-03

## 2020-01-03 RX ORDER — HYLAN G-F 20 16MG/2ML
16 SYRINGE (ML) INTRAARTICULAR
Qty: 12 | Refills: 0 | Status: COMPLETED | COMMUNITY
Start: 2017-11-02 | End: 2020-01-03

## 2020-01-03 RX ORDER — TELMISARTAN 40 MG/1
40 TABLET ORAL
Qty: 90 | Refills: 3 | Status: COMPLETED | COMMUNITY
End: 2020-01-03

## 2020-01-03 RX ORDER — ACETAMINOPHEN AND CODEINE 300; 30 MG/1; MG/1
300-30 TABLET ORAL
Qty: 25 | Refills: 0 | Status: COMPLETED | COMMUNITY
Start: 2017-03-13 | End: 2020-01-03

## 2020-01-03 RX ORDER — OMEPRAZOLE 20 MG/1
20 CAPSULE, DELAYED RELEASE ORAL
Qty: 30 | Refills: 0 | Status: COMPLETED | COMMUNITY
Start: 2017-05-02 | End: 2020-01-03

## 2020-01-03 RX ORDER — OMEPRAZOLE 40 MG/1
40 CAPSULE, DELAYED RELEASE ORAL
Qty: 30 | Refills: 0 | Status: COMPLETED | COMMUNITY
Start: 2017-08-08 | End: 2020-01-03

## 2020-01-03 RX ORDER — INDOMETHACIN 75 MG/1
75 CAPSULE, EXTENDED RELEASE ORAL
Qty: 60 | Refills: 0 | Status: COMPLETED | COMMUNITY
Start: 2017-06-22 | End: 2020-01-03

## 2020-01-03 RX ORDER — BETAMETHASONE DIPROPIONATE 0.5 MG/G
0.05 CREAM TOPICAL
Qty: 45 | Refills: 0 | Status: COMPLETED | COMMUNITY
Start: 2017-06-19 | End: 2020-01-03

## 2020-01-03 RX ORDER — DULOXETINE HYDROCHLORIDE 30 MG/1
30 CAPSULE, DELAYED RELEASE PELLETS ORAL
Qty: 30 | Refills: 0 | Status: COMPLETED | COMMUNITY
Start: 2017-06-01 | End: 2020-01-03

## 2020-01-03 RX ORDER — METOPROLOL SUCCINATE 25 MG/1
25 TABLET, EXTENDED RELEASE ORAL DAILY
Qty: 90 | Refills: 3 | Status: COMPLETED | COMMUNITY
Start: 2018-12-06 | End: 2020-01-03

## 2020-01-03 RX ORDER — DICLOFENAC SODIUM 50 MG/1
50 TABLET, DELAYED RELEASE ORAL
Qty: 60 | Refills: 0 | Status: COMPLETED | COMMUNITY
Start: 2017-05-02 | End: 2020-01-03

## 2020-01-03 NOTE — REVIEW OF SYSTEMS
[Back Pain] : back pain [Joint Pain] : joint pain [Itching] : Itching [Negative] : Heme/Lymph [Fever] : no fever [Chills] : no chills [Hot Flashes] : no hot flashes [Fatigue] : no fatigue [Recent Change In Weight] : ~T no recent weight change [Night Sweats] : no night sweats [Earache] : no earache [Nosebleed] : no nosebleeds [Hearing Loss] : no hearing loss [Postnasal Drip] : no postnasal drip [Sore Throat] : no sore throat [Hoarseness] : no hoarseness [Nasal Discharge] : no nasal discharge [Shortness Of Breath] : no shortness of breath [Wheezing] : no wheezing [Dyspnea on Exertion] : no dyspnea on exertion [Cough] : no cough

## 2020-01-03 NOTE — PHYSICAL EXAM
[Well Nourished] : well nourished [No Acute Distress] : no acute distress [Well Developed] : well developed [Normal Sclera/Conjunctiva] : normal sclera/conjunctiva [PERRL] : pupils equal round and reactive to light [Normal Outer Ear/Nose] : the outer ears and nose were normal in appearance [Normal Oropharynx] : the oropharynx was normal [Normal TMs] : both tympanic membranes were normal [No JVD] : no jugular venous distention [No Lymphadenopathy] : no lymphadenopathy [Supple] : supple [No Accessory Muscle Use] : no accessory muscle use [No Respiratory Distress] : no respiratory distress  [Clear to Auscultation] : lungs were clear to auscultation bilaterally [Normal Rate] : normal rate  [Regular Rhythm] : with a regular rhythm [Normal S1, S2] : normal S1 and S2 [No Varicosities] : no varicosities [No Abdominal Bruit] : a ~M bruit was not heard ~T in the abdomen [Pedal Pulses Present] : the pedal pulses are present [No Palpable Aorta] : no palpable aorta [No Extremity Clubbing/Cyanosis] : no extremity clubbing/cyanosis [Declined Breast Exam] : declined breast exam  [___ +] : bilateral [unfilled]U+ pitting edema to the ankles [Non Tender] : non-tender [Non-distended] : non-distended [Soft] : abdomen soft [No Masses] : no abdominal mass palpated [No HSM] : no HSM [Normal Bowel Sounds] : normal bowel sounds [Declined Rectal Exam] : declined rectal exam [Normal Anterior Cervical Nodes] : no anterior cervical lymphadenopathy [Normal Posterior Cervical Nodes] : no posterior cervical lymphadenopathy [No Joint Swelling] : no joint swelling [No CVA Tenderness] : no CVA  tenderness [No Spinal Tenderness] : no spinal tenderness [Grossly Normal Strength/Tone] : grossly normal strength/tone [Normal Affect] : the affect was normal [Normal Insight/Judgement] : insight and judgment were intact

## 2020-01-03 NOTE — HEALTH RISK ASSESSMENT
[No] : No [0] : 2) Feeling down, depressed, or hopeless: Not at all (0) [] : No [Audit-CScore] : 0 [[id number]] : 0 [MammogramDate] : 5/19 [ColonoscopyDate] : 11/2017 [PapSmearDate] : 5/19 [ColonoscopyComments] : Xander Barker

## 2020-01-03 NOTE — HISTORY OF PRESENT ILLNESS
[FreeTextEntry1] : here to follow ongoing conditions and for full exam [de-identified] : 3 nights ago severe itching from base of neck down to hips\par saw derm, given Xyzal 5 mg\par uses bath gel new one mint scented for second time before this started\par s/p total hip replacement 12/2018\par chronic back pain\par prediabetes / diabetes\par morbid obesity\par HTN on meds\par regular derm, gyn, mammo etc

## 2020-01-03 NOTE — ASSESSMENT
[FreeTextEntry1] : morbid obesity\par HTN well controlled\par edema\par pruritus likely from gel or food some improvement Xyzal\par limited by back

## 2020-01-30 ENCOUNTER — APPOINTMENT (OUTPATIENT)
Dept: ORTHOPEDIC SURGERY | Facility: CLINIC | Age: 65
End: 2020-01-30
Payer: COMMERCIAL

## 2020-01-30 VITALS
HEART RATE: 61 BPM | SYSTOLIC BLOOD PRESSURE: 136 MMHG | BODY MASS INDEX: 47.09 KG/M2 | HEIGHT: 66 IN | WEIGHT: 293 LBS | DIASTOLIC BLOOD PRESSURE: 79 MMHG

## 2020-01-30 PROCEDURE — 99213 OFFICE O/P EST LOW 20 MIN: CPT

## 2020-01-30 PROCEDURE — 73502 X-RAY EXAM HIP UNI 2-3 VIEWS: CPT | Mod: RT

## 2020-01-30 RX ORDER — METHYLPREDNISOLONE 4 MG/1
4 TABLET ORAL
Qty: 1 | Refills: 0 | Status: DISCONTINUED | COMMUNITY
Start: 2020-01-03 | End: 2020-01-30

## 2020-06-22 ENCOUNTER — RX RENEWAL (OUTPATIENT)
Age: 65
End: 2020-06-22

## 2020-12-19 ENCOUNTER — FORM ENCOUNTER (OUTPATIENT)
Age: 65
End: 2020-12-19

## 2020-12-20 ENCOUNTER — TRANSCRIPTION ENCOUNTER (OUTPATIENT)
Age: 65
End: 2020-12-20

## 2020-12-20 ENCOUNTER — RESULT REVIEW (OUTPATIENT)
Age: 65
End: 2020-12-20

## 2020-12-21 ENCOUNTER — NON-APPOINTMENT (OUTPATIENT)
Age: 65
End: 2020-12-21

## 2020-12-21 ENCOUNTER — APPOINTMENT (OUTPATIENT)
Dept: INTERNAL MEDICINE | Facility: CLINIC | Age: 65
End: 2020-12-21
Payer: MEDICARE

## 2020-12-21 ENCOUNTER — TRANSCRIPTION ENCOUNTER (OUTPATIENT)
Age: 65
End: 2020-12-21

## 2020-12-21 DIAGNOSIS — R43.0 ANOSMIA: ICD-10-CM

## 2020-12-21 DIAGNOSIS — R05 COUGH: ICD-10-CM

## 2020-12-21 PROCEDURE — 99442: CPT | Mod: CS,95

## 2020-12-22 ENCOUNTER — APPOINTMENT (OUTPATIENT)
Dept: DISASTER EMERGENCY | Facility: HOSPITAL | Age: 65
End: 2020-12-22

## 2020-12-22 ENCOUNTER — OUTPATIENT (OUTPATIENT)
Dept: OUTPATIENT SERVICES | Facility: HOSPITAL | Age: 65
LOS: 1 days | End: 2020-12-22
Payer: MEDICARE

## 2020-12-22 VITALS
SYSTOLIC BLOOD PRESSURE: 168 MMHG | OXYGEN SATURATION: 97 % | TEMPERATURE: 98 F | HEART RATE: 68 BPM | DIASTOLIC BLOOD PRESSURE: 91 MMHG | RESPIRATION RATE: 20 BRPM

## 2020-12-22 VITALS
SYSTOLIC BLOOD PRESSURE: 145 MMHG | TEMPERATURE: 98 F | OXYGEN SATURATION: 97 % | RESPIRATION RATE: 16 BRPM | HEART RATE: 60 BPM | DIASTOLIC BLOOD PRESSURE: 73 MMHG

## 2020-12-22 DIAGNOSIS — U07.1 COVID-19: ICD-10-CM

## 2020-12-22 DIAGNOSIS — Z90.710 ACQUIRED ABSENCE OF BOTH CERVIX AND UTERUS: Chronic | ICD-10-CM

## 2020-12-22 DIAGNOSIS — E89.0 POSTPROCEDURAL HYPOTHYROIDISM: Chronic | ICD-10-CM

## 2020-12-22 DIAGNOSIS — Z87.81 PERSONAL HISTORY OF (HEALED) TRAUMATIC FRACTURE: Chronic | ICD-10-CM

## 2020-12-22 DIAGNOSIS — Z98.890 OTHER SPECIFIED POSTPROCEDURAL STATES: Chronic | ICD-10-CM

## 2020-12-22 PROCEDURE — M0239: CPT

## 2020-12-22 RX ORDER — BAMLANIVIMAB 35 MG/ML
700 INJECTION, SOLUTION INTRAVENOUS ONCE
Refills: 0 | Status: COMPLETED | OUTPATIENT
Start: 2020-12-22 | End: 2020-12-22

## 2020-12-22 RX ADMIN — BAMLANIVIMAB 200 MILLIGRAM(S): 35 INJECTION, SOLUTION INTRAVENOUS at 12:07

## 2020-12-22 NOTE — MONOCLONAL ANTIBODY INFUSION - HOME MEDICATIONS
Multiple Vitamins oral tablet , 1 tab(s) orally once a day  levothyroxine 200 mcg (0.2 mg) oral tablet , 1 tab(s) orally once a day  zinc sulfate 220 mg oral capsule , 1 cap(s) orally once a day  senna oral tablet , 2 tab(s) orally once a day (at bedtime)  bisacodyl 5 mg oral delayed release tablet , 1 tab(s) orally every 12 hours, As needed, Constipation  spironolactone 25 mg oral tablet , 1 tab(s) orally 2 times a day  bumetanide 1 mg oral tablet , 1 tab(s) orally once a day  Bystolic 2.5 mg oral tablet , 1 tab(s) orally once a day  allopurinol 300 mg oral tablet , 1 tab(s) orally once a day  metFORMIN 500 mg oral tablet, extended release , 1 tab(s) orally once a day  apixaban 2.5 mg oral tablet , 1 tab(s) orally every 12 hours  olmesartan 20 mg oral tablet , 1 tab(s) orally once a day  celecoxib 200 mg oral capsule , 1 cap(s) orally every 12 hours  celecoxib 200 mg oral capsule , 1 cap(s) orally   oxyCODONE 5 mg oral tablet , 1 tab(s) orally every 4 hours, As Needed -Pain  MDD:6  Reference #: [id number]

## 2020-12-22 NOTE — MONOCLONAL ANTIBODY INFUSION - ASSESSMENT AND PLAN
PAST MEDICAL & SURGICAL HISTORY:  Venous insufficiency of lower extremity  History of gout  Type 2 diabetes mellitus  BMI 50.0-59.9, adult  Essential hypertension  Hypothyroid  Primary osteoarthritis of right hip  S/P hysterectomy  total  S/P thyroidectomy  partial  S/P carpal tunnel release  left  History of arm fracture  1993-right    CC: Monoclonal Antibody Infusion/COVID 19 Positive  65yFemale    Patient is covid + and presents today for monoclonoal antibody infusion.  Pt met criteria for the infusion and is medically cleared for infusion today.      exam/findings:  T(C): --  HR: --  BP: --  RR: --  SpO2: --      PE:   Appearance: NAD	  HEENT:   No Lymphadenopathy  Cardiovascular: RRR  Respiratory: CTA B/L  Gastrointestinal:  Soft, Non-tender  Skin: warm and dry, no rash, no lesions  Extremities: Neg edema    ASSESSMENT:  Patient is 64yo female with a past medical history of DM, HTN, Hypothyroidism found to be + Covid PCR( date)  referred by who presents to infusion center for Monoclonal antibody infusion (Bamlanivimab)  Symptoms/ Criteria:   Risk Profile includes:     PLAN:  - Infusion procedure explained to patient   - Consent for monoclonal antibody infusion obtained and placed in patient's bedside chart  - Risk and benefits discussed with the patient and all questions were answered  - Infuse Bamlanivimab 700mg IV over one hour  - Check vital signs immediately prior to infusion start and then after 15 minutes, 30 minutes, and at completion of infusion.   - Monitor the patient for one hour post infusion and then check vitals again prior to discharge.    Dedra Montesinos PA-C    Addendum @  Patient is cleared for discharge at ______. He/she tolerated full infusion well and denies complaints of chest pain, shortness of breath, nausea/vomiting, dizziness, or palpitations. Vital signs stable upon discharge. Patient is medically stable and cleared for discharge home. Discharge instructions provided to patient with fact sheet included. Patient was instructed to continue to self-isolate and use  infection control measures (e.g., wear mask, isolate, social distance, avoid sharing personal items, clean and disinfect “high touch” surfaces, and frequent handwashing) according to CDC guidelines. Patient instructed to follow up with PMD as needed.    Dedra Montesinos PA-C           PAST MEDICAL & SURGICAL HISTORY:  Venous insufficiency of lower extremity  History of gout  Type 2 diabetes mellitus  BMI 50.0-59.9, adult  Essential hypertension  Hypothyroid  Primary osteoarthritis of right hip  S/P hysterectomy  total  S/P thyroidectomy  partial  S/P carpal tunnel release  left  History of arm fracture  1993-right    CC: Monoclonal Antibody Infusion/COVID 19 Positive  65yFemale    Patient is covid + and presents today for monoclonoal antibody infusion.  Pt met criteria for the infusion and is medically cleared for infusion today.      T(C): 36.8 (12-22-20 @ 12:32), Max: 36.8 (12-22-20 @ 12:32)  HR: 62 (12-22-20 @ 12:32) (60 - 68)  BP: 121/74 (12-22-20 @ 12:32) (121/74 - 168/91)  RR: 18 (12-22-20 @ 12:32) (18 - 20)  SpO2: 98% (12-22-20 @ 12:17) (97% - 98%)      PE:   Appearance: NAD	  HEENT:   No Lymphadenopathy  Cardiovascular: RRR  Respiratory: CTA B/L  Gastrointestinal:  Soft, Non-tender  Skin: warm and dry, no rash, no lesions  Extremities: Neg edema    ASSESSMENT:  Patient is 66yo female with a past medical history of DM, HTN, Hypothyroidism found to be + Covid PCR( 12/20)  referred by Dr. Lio Hughes who presents to infusion center for Monoclonal antibody infusion (Bamlanivimab)  Symptoms/ Criteria:Cough, loss of smell/taste  Risk Profile includes:  66yo, BMI >35, HTN, DM    PLAN:  - Infusion procedure explained to patient   - Consent for monoclonal antibody infusion obtained and placed in patient's bedside chart  - Risk and benefits discussed with the patient and all questions were answered  - Infuse Bamlanivimab 700mg IV over one hour  - Check vital signs immediately prior to infusion start and then after 15 minutes, 30 minutes, and at completion of infusion.   - Monitor the patient for one hour post infusion and then check vitals again prior to discharge.    Dedra Montesinos PA-C    Addendum @    Vitals:    Patient is cleared for discharge. He/she tolerated full infusion well and denies complaints of chest pain, shortness of breath, nausea/vomiting, dizziness, or palpitations. Vital signs stable upon discharge. Patient is medically stable and cleared for discharge home. Discharge instructions provided to patient with fact sheet included. Patient was instructed to continue to self-isolate and use  infection control measures (e.g., wear mask, isolate, social distance, avoid sharing personal items, clean and disinfect “high touch” surfaces, and frequent handwashing) according to CDC guidelines. Patient instructed to follow up with PMD as needed.    Dedra Montesinos PA-C           PAST MEDICAL & SURGICAL HISTORY:  Venous insufficiency of lower extremity  History of gout  Type 2 diabetes mellitus  BMI 50.0-59.9, adult  Essential hypertension  Hypothyroid  Primary osteoarthritis of right hip  S/P hysterectomy  total  S/P thyroidectomy  partial  S/P carpal tunnel release  left  History of arm fracture  1993-right    CC: Monoclonal Antibody Infusion/COVID 19 Positive  65yFemale    Patient is covid + and presents today for monoclonoal antibody infusion.  Pt met criteria for the infusion and is medically cleared for infusion today.      T(C): 36.8 (12-22-20 @ 12:32), Max: 36.8 (12-22-20 @ 12:32)  HR: 62 (12-22-20 @ 12:32) (60 - 68)  BP: 121/74 (12-22-20 @ 12:32) (121/74 - 168/91)  RR: 18 (12-22-20 @ 12:32) (18 - 20)  SpO2: 98% (12-22-20 @ 12:17) (97% - 98%)      PE:   Appearance: NAD	  HEENT:   No Lymphadenopathy  Cardiovascular: RRR  Respiratory: CTA B/L  Gastrointestinal:  Soft, Non-tender  Skin: warm and dry, no rash, no lesions  Extremities: Neg edema    ASSESSMENT:  Patient is 64yo female with a past medical history of DM, HTN, Hypothyroidism found to be + Covid PCR( 12/20)  referred by Dr. Lio Hughes who presents to infusion center for Monoclonal antibody infusion (Bamlanivimab)  Symptoms/ Criteria:Cough, loss of smell/taste  Risk Profile includes:  64yo, BMI >35, HTN, DM    PLAN:  - Infusion procedure explained to patient   - Consent for monoclonal antibody infusion obtained and placed in patient's bedside chart  - Risk and benefits discussed with the patient and all questions were answered  - Infuse Bamlanivimab 700mg IV over one hour  - Check vital signs immediately prior to infusion start and then after 15 minutes, 30 minutes, and at completion of infusion.   - Monitor the patient for one hour post infusion and then check vitals again prior to discharge.    Dedra Montesinos PA-C    Addendum @2:15p    Vitals:  145/73  P: 60  T: 97.5  O2sat%: 97%    Patient is cleared for discharge. He/she tolerated full infusion well and denies complaints of chest pain, shortness of breath, nausea/vomiting, dizziness, or palpitations. Vital signs stable upon discharge. Patient is medically stable and cleared for discharge home. Discharge instructions provided to patient with fact sheet included. Patient was instructed to continue to self-isolate and use  infection control measures (e.g., wear mask, isolate, social distance, avoid sharing personal items, clean and disinfect “high touch” surfaces, and frequent handwashing) according to CDC guidelines. Patient instructed to follow up with PMD as needed.    Dedra Montesinos PA-C

## 2020-12-22 NOTE — MONOCLONAL ANTIBODY INFUSION - EXAM
T(C): 36.8 (12-22-20 @ 12:32), Max: 36.8 (12-22-20 @ 12:32)  HR: 62 (12-22-20 @ 12:32) (60 - 68)  BP: 121/74 (12-22-20 @ 12:32) (121/74 - 168/91)  RR: 18 (12-22-20 @ 12:32) (18 - 20)  SpO2: 98% (12-22-20 @ 12:17) (97% - 98%)      PE:   Appearance: NAD	  HEENT:   No Lymphadenopathy  Cardiovascular: RRR  Respiratory: CTA B/L  Gastrointestinal:  Soft, Non-tender  Skin: warm and dry, no rash, no lesions  Extremities: Neg edema

## 2020-12-23 ENCOUNTER — TRANSCRIPTION ENCOUNTER (OUTPATIENT)
Age: 65
End: 2020-12-23

## 2021-01-03 ENCOUNTER — RX RENEWAL (OUTPATIENT)
Age: 66
End: 2021-01-03

## 2021-03-01 ENCOUNTER — NON-APPOINTMENT (OUTPATIENT)
Age: 66
End: 2021-03-01

## 2021-03-19 ENCOUNTER — APPOINTMENT (OUTPATIENT)
Dept: INTERNAL MEDICINE | Facility: CLINIC | Age: 66
End: 2021-03-19
Payer: MEDICARE

## 2021-03-19 VITALS
BODY MASS INDEX: 47.09 KG/M2 | TEMPERATURE: 97.6 F | DIASTOLIC BLOOD PRESSURE: 74 MMHG | HEIGHT: 66 IN | WEIGHT: 293 LBS | SYSTOLIC BLOOD PRESSURE: 137 MMHG | HEART RATE: 71 BPM | OXYGEN SATURATION: 97 %

## 2021-03-19 DIAGNOSIS — E55.9 VITAMIN D DEFICIENCY, UNSPECIFIED: ICD-10-CM

## 2021-03-19 DIAGNOSIS — E07.9 DISORDER OF THYROID, UNSPECIFIED: ICD-10-CM

## 2021-03-19 DIAGNOSIS — E03.9 HYPOTHYROIDISM, UNSPECIFIED: ICD-10-CM

## 2021-03-19 DIAGNOSIS — E53.8 DEFICIENCY OF OTHER SPECIFIED B GROUP VITAMINS: ICD-10-CM

## 2021-03-19 PROCEDURE — 99213 OFFICE O/P EST LOW 20 MIN: CPT | Mod: CS

## 2021-03-19 RX ORDER — BUMETANIDE 1 MG/1
1 TABLET ORAL
Qty: 90 | Refills: 3 | Status: ACTIVE | COMMUNITY
Start: 2016-09-12 | End: 1900-01-01

## 2021-03-19 RX ORDER — AMOXICILLIN 500 MG/1
500 CAPSULE ORAL
Qty: 20 | Refills: 4 | Status: COMPLETED | COMMUNITY
Start: 2018-12-10 | End: 2021-03-19

## 2021-03-19 NOTE — PHYSICAL EXAM
[Well Nourished] : well nourished [Well Developed] : well developed [Normal Sclera/Conjunctiva] : normal sclera/conjunctiva [Normal Outer Ear/Nose] : the outer ears and nose were normal in appearance [Normal TMs] : both tympanic membranes were normal [No JVD] : no jugular venous distention [No Lymphadenopathy] : no lymphadenopathy [Supple] : supple [No Respiratory Distress] : no respiratory distress  [No Accessory Muscle Use] : no accessory muscle use [Clear to Auscultation] : lungs were clear to auscultation bilaterally [Normal Rate] : normal rate  [Regular Rhythm] : with a regular rhythm [Normal S1, S2] : normal S1 and S2 [No Abdominal Bruit] : a ~M bruit was not heard ~T in the abdomen [No Varicosities] : no varicosities [Pedal Pulses Present] : the pedal pulses are present [No Palpable Aorta] : no palpable aorta [No Extremity Clubbing/Cyanosis] : no extremity clubbing/cyanosis [___ +] : bilateral [unfilled]U+ pitting edema to the ankles [Declined Breast Exam] : declined breast exam  [Soft] : abdomen soft [Non Tender] : non-tender [Non-distended] : non-distended [No Masses] : no abdominal mass palpated [No HSM] : no HSM [Normal Bowel Sounds] : normal bowel sounds [Declined Rectal Exam] : declined rectal exam [Normal Posterior Cervical Nodes] : no posterior cervical lymphadenopathy [Normal Anterior Cervical Nodes] : no anterior cervical lymphadenopathy [No CVA Tenderness] : no CVA  tenderness [No Spinal Tenderness] : no spinal tenderness [No Joint Swelling] : no joint swelling [Grossly Normal Strength/Tone] : grossly normal strength/tone [Normal Affect] : the affect was normal [Normal Insight/Judgement] : insight and judgment were intact

## 2021-03-22 NOTE — HISTORY OF PRESENT ILLNESS
[FreeTextEntry1] : here to follow ongoing conditions [de-identified] : saw ENT for raspy throat\par told of reflux\par [facility] also seen, put on PPI\par had EGD, pathology normal\par morbid obesity\par s/p total hip replacement 12/2018

## 2021-03-22 NOTE — REVIEW OF SYSTEMS
[Joint Pain] : joint pain [Back Pain] : back pain [Negative] : Heme/Lymph [Fever] : no fever [Chills] : no chills [Fatigue] : no fatigue [Hot Flashes] : no hot flashes [Night Sweats] : no night sweats [Recent Change In Weight] : ~T no recent weight change [Earache] : no earache [Hearing Loss] : no hearing loss [Nosebleed] : no nosebleeds [Hoarseness] : no hoarseness [Nasal Discharge] : no nasal discharge [Sore Throat] : no sore throat [Postnasal Drip] : no postnasal drip [Shortness Of Breath] : no shortness of breath [Wheezing] : no wheezing [Cough] : no cough [Dyspnea on Exertion] : no dyspnea on exertion [Itching] : no itching [Skin Rash] : no skin rash

## 2021-03-22 NOTE — ASSESSMENT
[FreeTextEntry1] : morbid obesity\par weight her main issue\par had Covid 19 no severe illness\par unremarkable exam

## 2021-04-13 ENCOUNTER — NON-APPOINTMENT (OUTPATIENT)
Age: 66
End: 2021-04-13

## 2021-04-13 LAB
25(OH)D3 SERPL-MCNC: 52.2 NG/ML
ALBUMIN SERPL ELPH-MCNC: 4.1 G/DL
ALP BLD-CCNC: 71 U/L
ALT SERPL-CCNC: 15 U/L
ANION GAP SERPL CALC-SCNC: 11 MMOL/L
AST SERPL-CCNC: 17 U/L
BASOPHILS # BLD AUTO: 0.05 K/UL
BASOPHILS NFR BLD AUTO: 0.5 %
BILIRUB SERPL-MCNC: 0.3 MG/DL
BUN SERPL-MCNC: 27 MG/DL
CALCIUM SERPL-MCNC: 10.5 MG/DL
CHLORIDE SERPL-SCNC: 97 MMOL/L
CHOLEST SERPL-MCNC: 184 MG/DL
CK SERPL-CCNC: 42 U/L
CO2 SERPL-SCNC: 29 MMOL/L
CREAT SERPL-MCNC: 0.75 MG/DL
EOSINOPHIL # BLD AUTO: 0.16 K/UL
EOSINOPHIL NFR BLD AUTO: 1.7 %
ESTIMATED AVERAGE GLUCOSE: 134 MG/DL
GLUCOSE SERPL-MCNC: 99 MG/DL
HBA1C MFR BLD HPLC: 6.3 %
HCT VFR BLD CALC: 40.9 %
HDLC SERPL-MCNC: 47 MG/DL
HGB BLD-MCNC: 12.2 G/DL
IMM GRANULOCYTES NFR BLD AUTO: 0.6 %
LDLC SERPL CALC-MCNC: 116 MG/DL
LYMPHOCYTES # BLD AUTO: 1.74 K/UL
LYMPHOCYTES NFR BLD AUTO: 18 %
MAN DIFF?: NORMAL
MCHC RBC-ENTMCNC: 25.4 PG
MCHC RBC-ENTMCNC: 29.8 GM/DL
MCV RBC AUTO: 85.2 FL
MONOCYTES # BLD AUTO: 0.42 K/UL
MONOCYTES NFR BLD AUTO: 4.3 %
NEUTROPHILS # BLD AUTO: 7.25 K/UL
NEUTROPHILS NFR BLD AUTO: 74.9 %
NONHDLC SERPL-MCNC: 137 MG/DL
PLATELET # BLD AUTO: 373 K/UL
POTASSIUM SERPL-SCNC: 4.8 MMOL/L
PROT SERPL-MCNC: 8 G/DL
RBC # BLD: 4.8 M/UL
RBC # FLD: 17.5 %
SODIUM SERPL-SCNC: 137 MMOL/L
T4 FREE SERPL-MCNC: 1.3 NG/DL
TRIGL SERPL-MCNC: 106 MG/DL
TSH SERPL-ACNC: 2.7 UIU/ML
VIT B12 SERPL-MCNC: 582 PG/ML
WBC # FLD AUTO: 9.68 K/UL

## 2021-04-20 ENCOUNTER — NON-APPOINTMENT (OUTPATIENT)
Age: 66
End: 2021-04-20

## 2021-04-20 ENCOUNTER — APPOINTMENT (OUTPATIENT)
Dept: INTERNAL MEDICINE | Facility: CLINIC | Age: 66
End: 2021-04-20
Payer: MEDICARE

## 2021-04-20 VITALS — SYSTOLIC BLOOD PRESSURE: 100 MMHG | DIASTOLIC BLOOD PRESSURE: 70 MMHG

## 2021-04-20 VITALS
DIASTOLIC BLOOD PRESSURE: 78 MMHG | SYSTOLIC BLOOD PRESSURE: 167 MMHG | BODY MASS INDEX: 47.09 KG/M2 | HEIGHT: 66 IN | HEART RATE: 86 BPM | WEIGHT: 293 LBS

## 2021-04-20 VITALS — DIASTOLIC BLOOD PRESSURE: 70 MMHG | SYSTOLIC BLOOD PRESSURE: 120 MMHG

## 2021-04-20 DIAGNOSIS — G47.33 OBSTRUCTIVE SLEEP APNEA (ADULT) (PEDIATRIC): ICD-10-CM

## 2021-04-20 DIAGNOSIS — U07.1 COVID-19: ICD-10-CM

## 2021-04-20 DIAGNOSIS — R92.2 INCONCLUSIVE MAMMOGRAM: ICD-10-CM

## 2021-04-20 DIAGNOSIS — Z96.641 PRESENCE OF RIGHT ARTIFICIAL HIP JOINT: ICD-10-CM

## 2021-04-20 DIAGNOSIS — R01.1 CARDIAC MURMUR, UNSPECIFIED: ICD-10-CM

## 2021-04-20 LAB
BILIRUB UR QL STRIP: NEGATIVE
CLARITY UR: CLEAR
GLUCOSE UR-MCNC: NEGATIVE
HCG UR QL: 0.2 EU/DL
HGB UR QL STRIP.AUTO: NEGATIVE
KETONES UR-MCNC: NEGATIVE
LEUKOCYTE ESTERASE UR QL STRIP: NEGATIVE
NITRITE UR QL STRIP: NEGATIVE
PH UR STRIP: 5.5
PROT UR STRIP-MCNC: NEGATIVE
SP GR UR STRIP: 1.02

## 2021-04-20 PROCEDURE — 81003 URINALYSIS AUTO W/O SCOPE: CPT | Mod: QW

## 2021-04-20 PROCEDURE — G0438: CPT | Mod: CS

## 2021-04-20 PROCEDURE — G0403: CPT

## 2021-04-20 PROCEDURE — G0402 INITIAL PREVENTIVE EXAM: CPT

## 2021-04-20 PROCEDURE — 93000 ELECTROCARDIOGRAM COMPLETE: CPT | Mod: 59

## 2021-04-20 RX ORDER — KETOCONAZOLE 20 MG/G
2 CREAM TOPICAL TWICE DAILY
Qty: 1 | Refills: 3 | Status: ACTIVE | COMMUNITY
Start: 2020-01-03 | End: 1900-01-01

## 2021-04-22 NOTE — HEALTH RISK ASSESSMENT
[No] : No [0] : 2) Feeling down, depressed, or hopeless: Not at all (0) [Patient reported mammogram was normal] : Patient reported mammogram was normal [Patient reported PAP Smear was normal] : Patient reported PAP Smear was normal [] : No [Audit-CScore] : 0 [VLD5Pagev] : 0 [MammogramDate] : 6/2020 [ColonoscopyDate] : 11/2017 [PapSmearDate] : 5/19 [ColonoscopyComments] : Xander Barker

## 2021-04-22 NOTE — ASSESSMENT
[FreeTextEntry1] : Morbid obesity patient's main problem\par Past covid 19 illness not severe\par willing to get covid vaccine\par Edema and lymphedema\par Hypertension well controlled\par Obstructive sleep apnea

## 2021-04-22 NOTE — HISTORY OF PRESENT ILLNESS
[FreeTextEntry1] : here to follow ongoing conditions and for full exam [de-identified] : chronic edema\par uses support stocking\par s/p total hip replacement 12/2018\par chronic back pain\par prediabetes / diabetes A1C 6.3\par morbid obesity\par HTN on meds\par declines vaccines\par regular derm, gyn, mammo etc

## 2021-04-22 NOTE — PLAN
[FreeTextEntry1] : Echocardiogram to evaluate heart function\par Mammography and breast sonography follow up in a few months

## 2021-04-22 NOTE — PHYSICAL EXAM
[Well Nourished] : well nourished [Well Developed] : well developed [Normal Sclera/Conjunctiva] : normal sclera/conjunctiva [Normal Outer Ear/Nose] : the outer ears and nose were normal in appearance [Normal TMs] : both tympanic membranes were normal [No JVD] : no jugular venous distention [No Lymphadenopathy] : no lymphadenopathy [Supple] : supple [No Respiratory Distress] : no respiratory distress  [No Accessory Muscle Use] : no accessory muscle use [Clear to Auscultation] : lungs were clear to auscultation bilaterally [Normal Rate] : normal rate  [Regular Rhythm] : with a regular rhythm [Normal S1, S2] : normal S1 and S2 [No Abdominal Bruit] : a ~M bruit was not heard ~T in the abdomen [No Varicosities] : no varicosities [Pedal Pulses Present] : the pedal pulses are present [No Palpable Aorta] : no palpable aorta [No Extremity Clubbing/Cyanosis] : no extremity clubbing/cyanosis [___ +] : bilateral [unfilled]U+ pretibial pitting edema [Declined Breast Exam] : declined breast exam  [Soft] : abdomen soft [Non Tender] : non-tender [Non-distended] : non-distended [No Masses] : no abdominal mass palpated [No HSM] : no HSM [Normal Bowel Sounds] : normal bowel sounds [Declined Rectal Exam] : declined rectal exam [Normal Posterior Cervical Nodes] : no posterior cervical lymphadenopathy [Normal Anterior Cervical Nodes] : no anterior cervical lymphadenopathy [No CVA Tenderness] : no CVA  tenderness [No Spinal Tenderness] : no spinal tenderness [No Joint Swelling] : no joint swelling [Grossly Normal Strength/Tone] : grossly normal strength/tone [Normal Affect] : the affect was normal [Normal Insight/Judgement] : insight and judgment were intact [I] : a grade 1 [de-identified] : lymphedema

## 2021-05-18 ENCOUNTER — APPOINTMENT (OUTPATIENT)
Dept: INTERNAL MEDICINE | Facility: CLINIC | Age: 66
End: 2021-05-18
Payer: MEDICARE

## 2021-05-18 DIAGNOSIS — I10 ESSENTIAL (PRIMARY) HYPERTENSION: ICD-10-CM

## 2021-05-18 DIAGNOSIS — R60.9 EDEMA, UNSPECIFIED: ICD-10-CM

## 2021-05-18 PROCEDURE — 93306 TTE W/DOPPLER COMPLETE: CPT

## 2021-05-19 ENCOUNTER — NON-APPOINTMENT (OUTPATIENT)
Age: 66
End: 2021-05-19

## 2021-05-19 PROBLEM — R60.9 EDEMA: Status: ACTIVE | Noted: 2019-03-22

## 2021-08-12 ENCOUNTER — RX RENEWAL (OUTPATIENT)
Age: 66
End: 2021-08-12

## 2021-08-12 RX ORDER — OLMESARTAN MEDOXOMIL 20 MG/1
20 TABLET, FILM COATED ORAL
Qty: 90 | Refills: 3 | Status: ACTIVE | COMMUNITY
Start: 2016-06-08 | End: 1900-01-01

## 2021-08-24 ENCOUNTER — RX RENEWAL (OUTPATIENT)
Age: 66
End: 2021-08-24

## 2021-08-24 RX ORDER — METFORMIN ER 500 MG 500 MG/1
500 TABLET ORAL
Qty: 90 | Refills: 3 | Status: ACTIVE | COMMUNITY
Start: 2017-06-23 | End: 1900-01-01

## 2021-10-29 RX ORDER — NEBIVOLOL HYDROCHLORIDE 2.5 MG/1
2.5 TABLET ORAL DAILY
Qty: 90 | Refills: 3 | Status: ACTIVE | COMMUNITY
Start: 2021-10-29 | End: 1900-01-01

## 2021-11-10 ENCOUNTER — APPOINTMENT (OUTPATIENT)
Dept: INTERNAL MEDICINE | Facility: CLINIC | Age: 66
End: 2021-11-10
Payer: MEDICARE

## 2021-11-10 VITALS
BODY MASS INDEX: 47.09 KG/M2 | HEIGHT: 66 IN | WEIGHT: 293 LBS | DIASTOLIC BLOOD PRESSURE: 80 MMHG | SYSTOLIC BLOOD PRESSURE: 146 MMHG | HEART RATE: 80 BPM

## 2021-11-10 VITALS — DIASTOLIC BLOOD PRESSURE: 78 MMHG | SYSTOLIC BLOOD PRESSURE: 128 MMHG

## 2021-11-10 DIAGNOSIS — S09.90XA UNSPECIFIED INJURY OF HEAD, INITIAL ENCOUNTER: ICD-10-CM

## 2021-11-10 DIAGNOSIS — E11.9 TYPE 2 DIABETES MELLITUS W/OUT COMPLICATIONS: ICD-10-CM

## 2021-11-10 DIAGNOSIS — E66.01 MORBID (SEVERE) OBESITY DUE TO EXCESS CALORIES: ICD-10-CM

## 2021-11-10 DIAGNOSIS — Z48.02 ENCOUNTER FOR REMOVAL OF SUTURES: ICD-10-CM

## 2021-11-10 DIAGNOSIS — D03.9 MELANOMA IN SITU, UNSPECIFIED: ICD-10-CM
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
## 2021-11-10 DIAGNOSIS — N28.0 ISCHEMIA AND INFARCTION OF KIDNEY: ICD-10-CM

## 2021-11-10 PROCEDURE — 99214 OFFICE O/P EST MOD 30 MIN: CPT

## 2021-11-10 NOTE — HISTORY OF PRESENT ILLNESS
[FreeTextEntry1] : fall 10/31/2021 [de-identified] : tripped over edge between aguilar and bathroom injuring head on shower door\par spewing blood from head\par EMS called\par went to [place]\par CT head was ok\par over 20 staples to head\par chronic edema\par here to remove staples\par uses support stockings\par s/p total hip replacement 12/2018\par chronic back pain\par prediabetes / diabetes \par morbid obesity\par HTN on meds\par declines vaccines\par regular derm, gyn, mammo etc\par Patient feels completely back to baseline\par She has ecchymosis that is drifting down the left side of her face\par She is here for evaluation as well as for removal of staples

## 2021-11-10 NOTE — REVIEW OF SYSTEMS
[Joint Pain] : joint pain [Back Pain] : back pain [Negative] : Heme/Lymph [Fever] : no fever [Chills] : no chills [Fatigue] : no fatigue [Hot Flashes] : no hot flashes [Night Sweats] : no night sweats [Recent Change In Weight] : ~T no recent weight change [Earache] : no earache [Hearing Loss] : no hearing loss [Nosebleed] : no nosebleeds [Hoarseness] : no hoarseness [Nasal Discharge] : no nasal discharge [Sore Throat] : no sore throat [Postnasal Drip] : no postnasal drip [Shortness Of Breath] : no shortness of breath [Wheezing] : no wheezing [Cough] : no cough [Dyspnea on Exertion] : no dyspnea on exertion [Itching] : no itching [Skin Rash] : no skin rash [Headache] : no headache [Dizziness] : no dizziness [Fainting] : no fainting [Confusion] : no confusion [Memory Loss] : no memory loss [Unsteady Walking] : no ataxia

## 2021-11-10 NOTE — ASSESSMENT
[FreeTextEntry1] : wound left scalp looks great\par 24 staple removed\par Morbid obesity\par Past covid 19 illness not severe\par Edema and lymphedema\par Hypertension well controlled\par Obstructive sleep apnea

## 2021-11-17 RX ORDER — ALLOPURINOL 300 MG/1
300 TABLET ORAL
Qty: 90 | Refills: 3 | Status: ACTIVE | COMMUNITY
Start: 2016-12-06 | End: 1900-01-01

## 2022-01-14 NOTE — PATIENT PROFILE ADULT - FUNCTIONAL SCREEN CURRENT LEVEL: DRESSING, MLM
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

## 2023-02-06 RX ORDER — NEBIVOLOL 2.5 MG/1
2.5 TABLET ORAL DAILY
Qty: 90 | Refills: 0 | Status: ACTIVE | COMMUNITY
Start: 1900-01-01 | End: 1900-01-01

## 2023-12-15 RX ORDER — AMOXICILLIN 500 MG/1
500 CAPSULE ORAL
Qty: 8 | Refills: 2 | Status: ACTIVE | COMMUNITY
Start: 2023-12-15 | End: 1900-01-01

## 2024-03-26 NOTE — PHYSICAL THERAPY INITIAL EVALUATION ADULT - TRANSFER SAFETY CONCERNS NOTED: SIT/STAND, REHAB EVAL
In an effort to ensure that our patients [employer], a Team Member has reviewed your chart and identified an opportunity to provide the best care possible. An attempt was made to discuss or schedule overdue Preventive or Disease Management screening.     The Outcome was Contact was made, appointment scheduled. Care Gaps include Wellness Visits.    
losing balance/decreased weight-shifting ability/decreased step length

## 2024-04-21 NOTE — H&P PST ADULT - PAIN CHRONIC, PROFILE
Ortho Sports Medicine Knee New Patient Visit     Assesment:   44 y.o. male right knee lateral meniscus tear sustained at work beginning of March 2024     Plan:  The patient's diagnosis and treatment were discussed at length today. We discussed no treatment, non-operative treatment, and operative treatment.     Amandeep presents today for initial evaluation right knee lateral meniscus tear.  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  He was in agreement with this plan.  I discussed with him that his MRI does demonstrate partial tear of the meniscal root, however the integrity of the root is unclear based on MRI.  Given this I discussed with him partial meniscectomy versus meniscal root repair.  I did review the recovery timeline of both procedures.  All risks and benefits were reviewed and surgical consent was signed for right knee arthroscopy with partial medial and/or lateral meniscectomy versus repair.  We did not provide him with a T ROM brace, however if we do a root repair we will provide him 1 at the date of surgery.  He can continue to form activity as tolerated using pain as a guide.  He is to follow-up with me approximately 5 to 7 days postoperatively.     He did also mention to me he has a history of a DVT several years ago that occurred after his cardiac ablation.  Because of this I recommend a course of Eliquis postoperatively, either 30 days for 6 weeks pending partial meniscectomy versus repair.  He is in agreement with this plan.     Given that the patient has failed conservative treatment and continues to have severe pain and/or dysfunction that limits their activities of daily living, they would like to proceed with operative intervention. We discussed with the patient the risks of no treatment, non-operative treatment, and operative  treatment. The risks of operative intervention were discussed and include but are not limited to: Infection, bleeding, stiffness, loss of range of motion, blood clot, failure of surgery, fracture, delayed union, nonunion, malunion, risk of potential future arthritis, continued problems with swelling, injury to surrounding structures/nerve/artery/vein, recurrence of cysts, failure of hardware, retained hardware and/or foreign body, symptomatic hardware, and continued instability, pain, dysfunction, or disability despite repair.         Conservative treatment:     Ice to knee for 20 minutes at least 1-2 times daily.  OTC NSAIDS prn for pain.  Let pain guide gradual return activities.  T ROM not provided at today's visit.     Imaging:     All imaging from today was reviewed by myself and explained to the patient.         Injection:     No Injection planned at this time.       Surgery:     All of the risks and benefits of operative treatment were explained to the patient, as well as the risks and benefits of any alternative treatment options, including nonoperative care. The risks of surgical treatement include, but are not limited to, infection, bleeding, blood clot, neurovascular damage, need for further surgery, continued pain, cardiovascular risk, and anesthesia risk.  The patient understood this and elects to proceed forward with surgical intervention.  We will proceed forward with right knee partial medial and/or lateral meniscectomy versus repair and all associated procedures.        Follow up:     Return for follow up 5-7 days post-op.               Chief Complaint   Patient presents with    Right Knee - Pain         History of Present Illness:     The patient is a 44 y.o. 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 Rodriguez, seen in clinic for consultation of right knee pain.  Work-related injury at the beginning of March when he states he was working on a roof carrying  heavy equipment when he twisted awkwardly and felt sharp pain and a pop in the knee.  He states during the injury he felt a pop along the lateral aspect of his knee followed by significant pain.  He does still report lateral knee pain as well as posterior knee pain with swelling.  He currently describes his pain as dull and achy and rates it a 4-10.  He states that deep squatting and twisting increases his symptoms.  He does occasionally get a clicking and catching sensation felt in his knee.  He does have a prior history of a right knee partial bursectomy 15+ years ago after repeated aspirations.  He is currently manages pain with ice and over-the-counter medication.  He denies any numbness or tingling.     Knee Surgical History:  Prior Right knee bursectomy 15 years ago     Past Medical, Social and Family History:  Medical History        Past Medical History:   Diagnosis Date    DVT (deep venous thrombosis) (HCC)       left     SVT (supraventricular tachycardia)           Surgical History         Past Surgical History:   Procedure Laterality Date    ABLATION OF DYSRHYTHMIC FOCUS        BACK SURGERY        KNEE SURGERY        SPINAL FUSION             No Known Allergies         Current Outpatient Medications on File Prior to Visit   Medication Sig Dispense Refill    naproxen (EC NAPROSYN) 500 MG EC tablet Take 1 tablet (500 mg total) by mouth 2 (two) times a day with meals (Patient not taking: Reported on 4/10/2024) 30 tablet 2      No current facility-administered medications on file prior to visit.      Social History               Socioeconomic History    Marital status:        Spouse name: Not on file    Number of children: Not on file    Years of education: Not on file    Highest education level: Not on file   Occupational History    Not on file   Tobacco Use    Smoking status: Never       Passive exposure: Past    Smokeless tobacco: Former       Types: Chew   Vaping Use    Vaping status: Never Used  "  Substance and Sexual Activity    Alcohol use: No    Drug use: Never    Sexual activity: Not on file   Other Topics Concern    Not on file   Social History Narrative    Not on file      Social Determinants of Health      Financial Resource Strain: Not on file   Food Insecurity: Not on file   Transportation Needs: Not on file   Physical Activity: Not on file   Stress: Not on file   Social Connections: Not on file   Intimate Partner Violence: Not on file   Housing Stability: Not on file               I have reviewed the past medical, surgical, social and family history, medications and allergies as documented in the EMR.    Review of systems: ROS is negative other than that noted in the HPI.  Constitutional: Negative for fatigue and fever.   HENT: Negative for sore throat.    Respiratory: Negative for shortness of breath.    Cardiovascular: Negative for chest pain.   Gastrointestinal: Negative for abdominal pain.   Endocrine: Negative for cold intolerance and heat intolerance.   Genitourinary: Negative for flank pain.   Musculoskeletal: Negative for back pain.   Skin: Negative for rash.   Allergic/Immunologic: Negative for immunocompromised state.   Neurological: Negative for dizziness.   Psychiatric/Behavioral: Negative for agitation.       Physical Exam:     Blood pressure (!) 176/102, pulse 90, resp. rate 18, height 6' 4\" (1.93 m), weight 122 kg (270 lb).     General/Constitutional: NAD, well developed, well nourished  HENT: Normocephalic, atraumatic  CV: Intact distal pulses, regular rate  Resp: No respiratory distress or labored breathing  Lymphatic: No lymphadenopathy palpated  Neuro: Alert and Oriented x 3, no focal deficits  Psych: Normal mood, normal affect, normal judgement, normal behavior  Skin: Warm, dry, no rashes, no erythema        Knee Exam (focused):  Visual inspection of the Right knee demonstrates normal contour without atrophy.   Healed previous incisions   There is no significant erythema or " edema.    Tracejoint effusion   Range of motion is full from 0-130 degrees of flexion   Able to straight leg raise   No bursal tender to palpation  Minimal medial joint line tenderness, + lateral joint line tenderness  - medial Aramis's, + lateral Aramis's  1A Lachman exam, Stable posterior drawer  - dial test  Stable to varus and valgus stress at both 0 and 30°  Patella tracks normally.  No J sign.  No apprehension.  Translation is approximately 2 quadrants and is equal to the contralateral side  Patellar eversion is similar to the contralateral side     Examination of the patient's ipsilateral hip demonstrates full painless range of motion.  No crepitus.      LE NV Exam: +2 DP/PT pulses bilaterally  Sensation intact to light touch L2-S1 bilaterally     Bilateral hip ROM demonstrates no pain actively or passively     No calf tenderness to palpation bilaterally     Knee Imaging     X-rays of the right knee were reviewed, which demonstrate no acute osseous abnormalities with mild medial compartment osteoarthritis.  I have reviewed the radiology report and agree with their impression.     MRI of the right knee were reviewed, which demonstrate undersurface tearing posterior root lateral meniscus without flipped fragment.  Medial meniscus appears intact.  There is grade 3 cartilage changes along the mid trochlea as well as grade 2/grade 3 cartilage fissuring along the weightbearing aspect of the medial femoral condyle.  Lastly, there is evidence of complex collection around the prepatellar bursa suspicious of chronic hemorrhagic bursitis.  I have reviewed the radiology report and agree with their impression.        Scribe Attestation       I,:  Tre Aleman am acting as a scribe while in the presence of the attending physician.:       I,:  Armond Bourne, DO personally performed the services described in this documentation    as scribed in my presence.:        no

## 2024-06-17 NOTE — PATIENT PROFILE ADULT - HAS THE PATIENT RECEIVED THE INFLUENZA VACCINE THIS SEASON?
Physical Therapy    Physical Therapy Treatment    Patient Name: Joseline Umana  MRN: [id number]  Today's Date: 6/17/2024  Time Calculation  Start Time: 1111  Stop Time: 1141  Time Calculation (min): 30 min    Assessment/Plan   PT Assessment  PT Assessment Results: Decreased strength, Decreased range of motion, Decreased endurance, Impaired balance, Decreased mobility  Rehab Prognosis: Fair  Evaluation/Treatment Tolerance: Patient limited by pain, Patient limited by fatigue  Assessment Comment: RN present at started and reported pt has pain in Left wrist and is swollen pt is not movig it,  was notified. performed all transfers with arm in arm. pt stood and took a few steps to chair wth cues on foot placement. mod assist x2 to safely get to chair. pt then performed exercises and started to fall asleep with cues to keep going and stay awake. pt reported feeling tired.  End of Session Patient Position: Up in chair, Alarm on     PT Plan  Treatment/Interventions: Bed mobility, Transfer training, Gait training, Balance training, Strengthening, Range of motion, Endurance training, Therapeutic exercise, Therapeutic activity, Positioning, Neuromuscular re-education  PT Plan: Ongoing PT  PT Frequency: 5 times per week      General Visit Information:   PT  Visit  PT Received On: 06/17/24       Subjective   Precautions:     Vital Signs:       Objective   Pain:  Pain Assessment  Pain Score:  (pain in LLE not rated with movement.)  Cognition:  Cognition  Orientation Level: Disoriented to situation, Disoriented to time    Treatments:  Therapeutic Exercise  Therapeutic Exercise Performed: Yes  Therapeutic Exercise Activity 1: LAQ,ankle pumps x10 ea    Bed Mobility 1  Bed Mobility 1: Supine to sitting  Level of Assistance 1: Moderate assistance    Ambulation/Gait Training 1  Surface 1: Level tile  Device 1:  (arm in arm)  Assistance 1: Moderate assistance (x2)  Quality of Gait 1: Narrow base of support, Inconsistent stride length,  Decreased step length, Shuffling gait  Comments/Distance (ft) 1: 3 ft bed to chair  Transfer 1  Technique 1: Sit to stand, Stand to sit  Transfer Device 1:  (arm in arm)  Transfer Level of Assistance 1: Moderate assistance  Trials/Comments 1: EOB    Outcome Measures:  [facility] Basic Mobility  Turning from your back to your side while in a flat bed without using bedrails: A lot  Moving from lying on your back to sitting on the side of a flat bed without using bedrails: A lot  Moving to and from bed to chair (including a wheelchair): A lot  Standing up from a chair using your arms (e.g. wheelchair or bedside chair): A lot  To walk in hospital room: Total  Climbing 3-5 steps with railing: Total  Basic Mobility - Total Score: 10    Education Documentation  Mobility Training, taught by Andree Stearns PTA at 6/17/2024 12:17 PM.  Learner: Patient  Readiness: Acceptance  Method: Explanation  Response: Verbalizes Understanding    Mobility Training, taught by Andree Stearns PTA at 6/17/2024 12:13 PM.  Learner: Patient  Readiness: Acceptance  Method: Explanation  Response: Verbalizes Understanding    Education Comments  No comments found.        OP EDUCATION:       Encounter Problems       Encounter Problems (Active)       Mobility       STG - Patient will ambulate 20 ft with 2WW and Mod A (Progressing)       Start:  06/15/24    Expected End:  06/29/24               PT Transfers       STG - Patient to transfer to and from sit to supine Mod A x1 (Progressing)       Start:  06/15/24    Expected End:  06/29/24            STG - Patient will transfer sit to and from stand with Close S and FWW (Progressing)       Start:  06/15/24    Expected End:  06/29/24                    yes...

## 2024-06-25 NOTE — H&P ADULT - CLICK TO LAUNCH ORM
Patient : Marge Oreilly Age: 72 year old Sex: female   MRN: [id number] Encounter Date: 6/24/2024    History     Chief Complaint   Patient presents with    Fall    Shoulder Pain       HPI    Marge Oreilly is a 72 year old presenting to the emergency department with complaint of right shoulder pain after he tripped over dog leash landing on her right shoulder.  The patient is left-hand dominant.  She is not on any blood thinners.  There is no head or neck injury.  Pain is rated 8 out of 10 worse with any movement        Past/Family/Social History     Allergies   Allergen Reactions    Cheratussin Ac [Guaifenesin-Codeine] Other (See Comments)     Extreme heart burn    Erythromycin SWELLING     \"Throat felt like it was closing\"       Current Facility-Administered Medications   Medication    HYDROcodone-acetaminophen (NORCO) 5-325 MG per tablet 1 tablet     Current Outpatient Medications   Medication Sig    levothyroxine 75 MCG tablet Take 1 tablet by mouth daily.    albuterol 108 (90 Base) MCG/ACT inhaler Inhale 2 puffs into the lungs every 4 hours as needed for Shortness of Breath or Wheezing.    prednisoLONE acetate (Pred Forte) 1 % ophthalmic suspension Place 1 drop into both eyes every 4 hours.    fluticasone-salmeterol 250-50 MCG/ACT inhaler Inhale 1 puff into the lungs in the morning and 1 puff in the evening.       Past Medical History:   Diagnosis Date    Hypothyroidism 9/19/2018    Seizures  (CMD)        Past Surgical History:   Procedure Laterality Date    Cholecystectomy      Hysterectomy      still has ovaries       Family History   Problem Relation Age of Onset    Osteoarthritis Mother     Heart disease Mother     Cancer, Lung Mother     Congestive Heart Failure Father 72       Social History     Tobacco Use    Smoking status: Some Days     Current packs/day: 0.00     Types: Cigarettes     Last attempt to quit: 1/1/2009     Years since quitting: 15.4    Smokeless tobacco: Never    Tobacco comments:     1  cigarettes a week    Substance Use Topics    Alcohol use: Yes     Comment: SOCIAL    Drug use: No          Review of Systems   Review of Symptoms     Review of Systems   Constitutional:  Positive for activity change.   Musculoskeletal:  Positive for arthralgias and joint swelling. Negative for neck pain and neck stiffness.   Skin:  Negative for color change and wound.   Neurological:  Positive for weakness.          Physical Exam   Physical Exam     ED Triage Vitals [06/24/24 1934]   ED Triage Vitals Group      Temp 97.9 °F (36.6 °C)      Heart Rate 80      Resp 18      BP (!) 145/77      SpO2 97 %      EtCO2 mmHg       Height 5' (1.524 m)      Weight 174 lb 6.1 oz (79.1 kg)      Weight Scale Used       BMI (Calculated) 34.06      IBW/kg (Calculated) 45.5       Physical Exam  Vitals and nursing note reviewed.   Constitutional:       General: She is not in acute distress.     Appearance: Normal appearance.   Musculoskeletal:      Right shoulder: Swelling, effusion, tenderness, bony tenderness and crepitus present. No deformity or laceration. Decreased range of motion. Decreased strength. Normal pulse.      Right upper arm: Tenderness present. No deformity.      Right elbow: Normal.   Neurological:      Mental Status: She is alert.            Procedures   ED Procedures     Procedures     Lab Results   ED Lab   No results found for this visit on 06/24/24.       EKG         Radiology Results   ED Radiology Results     Imaging Results              XR SHOULDER 3 VIEWS RIGHT (Final result)  Result time 06/24/24 20:10:35      Final result                   Impression:    IMPRESSION:    FRACTURE: Acute fractures involving the proximal humerus including  comminuted greater tuberosity fracture and subtle oblique nondisplaced  fracture involving the proximal humeral metaphysis. No dislocation.    JOINT SPACES: Degenerative changes at the acromioclavicular joint.    SOFT TISSUES: Unremarkable.    BONE MINERAL:  Normal.    ADDITIONAL FINDINGS: None.    Electronically Signed by: Issa Agustin MD  Signed on: 6/24/2024 8:10 PM  Created on Workstation ID: [id number]  Signed on Workstation ID: [id number]               Narrative:    XR SHOULDER 3 VIEWS RIGHT,  6/24/2024 7:52 PM.    INDICATION: fall, shoulder pain.    COMPARISON: None.    FINDINGS/                                         ED Medications   ED Medications     ED Medication Orders (From admission, onward)      None                 ED Course   72-year-old female mechanical fall onto right shoulder with immediate pain swelling and ability with range of motion    Patient does fracture of the greater tuberosity as well as nondisplaced like for no metaphyses.  Patient will be placed in a arm immobilizer recommended that ice and scheduled ibuprofen with hydrocodone for severe pain  Vitals:    06/24/24 1934 06/24/24 1958   BP: (!) 145/77 (!) 142/80   Pulse: 80 73   Resp: 18    Temp: 97.9 °F (36.6 °C)    TempSrc: Oral    SpO2: 97% 97%   Weight: 79.1 kg (174 lb 6.1 oz)    Height: 5' (1.524 m)             Independent Review Completed in ED course        Consults                    Medical Decision Making                                        Critical Care               Disposition       Clinical Impression and Diagnosis  8:35 PM       ED Diagnosis    None         Follow Up:  No follow-up provider specified.        Summary of your Discharge Medications      You have not been prescribed any medications.         Pt is discharged to home/self care in stable condition.              There is no disposition no dispo time  There is no comment                   Derian Garrido MD  06/24/24 2039     .

## 2025-06-26 NOTE — PATIENT PROFILE ADULT - NSPROEDAREADYLEARN_GEN_A_NUR
Caller: Pearl Elizalde    Relationship: Self    Best call back number: [phone]    What medication are you requesting: SOMETHING FOR UTI     What are your current symptoms: URGENCY WITH URINATION BACK ACHE NOT SLEEPING     How long have you been experiencing symptoms: 1 MONTH     Have you had these symptoms before:      [x] Yes  [] No    Have you been treated for these symptoms before:     [x] Yes  [] No    If a prescription is needed, what is your preferred pharmacy and phone number: [id number] - [place], [place] - [address] AT [facility] [address] - [phone]  - [phone]      Additional notes: PLEASE CALL TO CONFIRM OR DENY        Pre-op Diagnosis: Primary osteoarthritis of right hip [M16.11]    The above referenced H&P was reviewed by Suresh Stafford MD on 3/21/2022, the patient was examined and no significant changes have occurred in the patient's condition since the H&P was performed. I discussed with the patient and/or legal representative the potential benefits, risks and side effects of this procedure; the likelihood of the patient achieving goals; and potential problems that might occur during recuperation. I discussed reasonable alternatives to the procedure, including risks, benefits and side effects related to the alternatives and risks related to not receiving this procedure. We will proceed with procedure as planned. interest in learning